# Patient Record
Sex: FEMALE | Race: WHITE | NOT HISPANIC OR LATINO | Employment: FULL TIME | URBAN - METROPOLITAN AREA
[De-identification: names, ages, dates, MRNs, and addresses within clinical notes are randomized per-mention and may not be internally consistent; named-entity substitution may affect disease eponyms.]

---

## 2020-09-25 ENCOUNTER — TRANSCRIBE ORDERS (OUTPATIENT)
Dept: LAB | Facility: CLINIC | Age: 45
End: 2020-09-25

## 2020-09-25 ENCOUNTER — APPOINTMENT (OUTPATIENT)
Dept: LAB | Facility: CLINIC | Age: 45
End: 2020-09-25

## 2020-09-25 DIAGNOSIS — Z02.1 PRE-EMPLOYMENT HEALTH SCREENING EXAMINATION: ICD-10-CM

## 2020-09-25 DIAGNOSIS — Z02.1 PRE-EMPLOYMENT HEALTH SCREENING EXAMINATION: Primary | ICD-10-CM

## 2020-09-25 PROCEDURE — 36415 COLL VENOUS BLD VENIPUNCTURE: CPT

## 2020-09-25 PROCEDURE — 86480 TB TEST CELL IMMUN MEASURE: CPT

## 2020-09-25 PROCEDURE — 86787 VARICELLA-ZOSTER ANTIBODY: CPT

## 2020-09-28 LAB
GAMMA INTERFERON BACKGROUND BLD IA-ACNC: 0.02 IU/ML
M TB IFN-G BLD-IMP: NEGATIVE
M TB IFN-G CD4+ BCKGRND COR BLD-ACNC: 0.01 IU/ML
M TB IFN-G CD4+ BCKGRND COR BLD-ACNC: 0.02 IU/ML
MITOGEN IGNF BCKGRD COR BLD-ACNC: >10 IU/ML

## 2020-09-29 LAB — VZV IGG SER IA-ACNC: NORMAL

## 2020-11-09 DIAGNOSIS — Z20.828 EXPOSURE TO SARS-ASSOCIATED CORONAVIRUS: Primary | ICD-10-CM

## 2020-11-09 PROCEDURE — U0003 INFECTIOUS AGENT DETECTION BY NUCLEIC ACID (DNA OR RNA); SEVERE ACUTE RESPIRATORY SYNDROME CORONAVIRUS 2 (SARS-COV-2) (CORONAVIRUS DISEASE [COVID-19]), AMPLIFIED PROBE TECHNIQUE, MAKING USE OF HIGH THROUGHPUT TECHNOLOGIES AS DESCRIBED BY CMS-2020-01-R: HCPCS | Performed by: PHYSICIAN ASSISTANT

## 2020-11-10 LAB — SARS-COV-2 RNA SPEC QL NAA+PROBE: NOT DETECTED

## 2021-01-14 ENCOUNTER — OFFICE VISIT (OUTPATIENT)
Dept: FAMILY MEDICINE CLINIC | Facility: CLINIC | Age: 46
End: 2021-01-14
Payer: COMMERCIAL

## 2021-01-14 VITALS
TEMPERATURE: 98.7 F | HEIGHT: 66 IN | DIASTOLIC BLOOD PRESSURE: 88 MMHG | SYSTOLIC BLOOD PRESSURE: 138 MMHG | RESPIRATION RATE: 17 BRPM | BODY MASS INDEX: 43.07 KG/M2 | WEIGHT: 268 LBS | OXYGEN SATURATION: 99 % | HEART RATE: 75 BPM

## 2021-01-14 DIAGNOSIS — Z12.31 BREAST CANCER SCREENING BY MAMMOGRAM: ICD-10-CM

## 2021-01-14 DIAGNOSIS — H73.893 TYMPANIC MEMBRANE RETRACTION, BILATERAL: ICD-10-CM

## 2021-01-14 DIAGNOSIS — Z13.29 THYROID DISORDER SCREEN: ICD-10-CM

## 2021-01-14 DIAGNOSIS — F41.9 ANXIETY AND DEPRESSION: ICD-10-CM

## 2021-01-14 DIAGNOSIS — F42.9 OBSESSIVE-COMPULSIVE DISORDER, UNSPECIFIED TYPE: ICD-10-CM

## 2021-01-14 DIAGNOSIS — Z76.89 ENCOUNTER TO ESTABLISH CARE WITH NEW DOCTOR: Primary | ICD-10-CM

## 2021-01-14 DIAGNOSIS — Z13.1 DIABETES MELLITUS SCREENING: ICD-10-CM

## 2021-01-14 DIAGNOSIS — H91.93 HEARING DIFFICULTY OF BOTH EARS: ICD-10-CM

## 2021-01-14 DIAGNOSIS — Z86.39 HISTORY OF VITAMIN D DEFICIENCY: ICD-10-CM

## 2021-01-14 DIAGNOSIS — N39.3 STRESS INCONTINENCE, FEMALE: ICD-10-CM

## 2021-01-14 DIAGNOSIS — J30.2 SEASONAL ALLERGIES: ICD-10-CM

## 2021-01-14 DIAGNOSIS — Z12.4 CERVICAL CANCER SCREENING: ICD-10-CM

## 2021-01-14 DIAGNOSIS — N92.1 MENORRHAGIA WITH IRREGULAR CYCLE: ICD-10-CM

## 2021-01-14 DIAGNOSIS — Z11.4 ENCOUNTER FOR SCREENING FOR HIV: ICD-10-CM

## 2021-01-14 DIAGNOSIS — E66.01 MORBID OBESITY (HCC): ICD-10-CM

## 2021-01-14 DIAGNOSIS — F32.A ANXIETY AND DEPRESSION: ICD-10-CM

## 2021-01-14 DIAGNOSIS — Z13.220 LIPID SCREENING: ICD-10-CM

## 2021-01-14 PROCEDURE — 99203 OFFICE O/P NEW LOW 30 MIN: CPT | Performed by: FAMILY MEDICINE

## 2021-01-14 RX ORDER — IBUPROFEN 200 MG
200 TABLET ORAL EVERY 6 HOURS PRN
COMMUNITY
End: 2022-04-29 | Stop reason: ALTCHOICE

## 2021-01-14 RX ORDER — BUPROPION HYDROCHLORIDE 300 MG/1
300 TABLET ORAL EVERY MORNING
COMMUNITY
Start: 2020-12-16 | End: 2021-02-05 | Stop reason: SDUPTHER

## 2021-01-14 RX ORDER — MELATONIN
1000 DAILY
COMMUNITY
End: 2021-02-22 | Stop reason: SDUPTHER

## 2021-01-14 NOTE — PROGRESS NOTES
Assessment/Plan:         Diagnoses and all orders for this visit:    Encounter to establish care with new doctor    Menorrhagia with irregular cycle  Comments:  GYN may likely recommend hysterectomy  Orders:  -     Ambulatory referral to Gynecology; Future  -     CBC and differential; Future    Stress incontinence, female  Comments:  had seen a urogyn about 2 yrs ago, wasdoing pelvic floor therapy- no f/u due to pandemic start, will wait to see GYN plan for her heavy menses- QUINTEN possibly     Hearing difficulty of both ears  Comments:  going on for about a year- worse since people wearing masks, hold nose and pop and it goes away I can hear for awhile, then comes back  Orders:  -     Ambulatory Referral to Otolaryngology; Future    Tympanic membrane retraction, bilateral  -     Ambulatory Referral to Otolaryngology; Future    Seasonal allergies    Morbid obesity ([facility] [address] )  -     Vitamin D 25 hydroxy; Future    History of vitamin D deficiency  -     Vitamin D 25 hydroxy; Future    Anxiety and depression  Comments:  stable on wellbutrin longstanding    Obsessive-compulsive disorder, unspecified type    Lipid screening  -     Lipid panel; Future    Breast cancer screening by mammogram  -     Mammo screening bilateral w 3d & cad; Future    Diabetes mellitus screening  -     Comprehensive metabolic panel; Future    Cervical cancer screening  -     Ambulatory referral to Gynecology; Future    Thyroid disorder screen  -     TSH, 3rd generation with Free T4 reflex; Future    Encounter for screening for HIV  -     HIV 1/2 Antigen/Antibody (4th Generation) w Reflex [facility]; Future              Subjective:   Chief Complaint   Patient presents with    Establish Care    Hearing Problem     Having trouble with hearing    Anxiety     Currently on Wellbutrin, anxiety has been increased lately    Menstrual Problem     Menstrual is worsening in the last 6 months   First two days are rough, passed gulf size blood clots         Patient ID: Judithe Lesches is a 50 y o  female  New pt- no old records in chart, including care-everywhere, pt states she tried to set it up but didn't work, switched jobs  has seen [address] [facility] family doctor for many years  Has been on wellbutrin for 3-4 yrs for depression, anxiety and OCD, seeing a psychiatrist and therapist, then had terrible insurance for a time, so her family doctor took over management  States she has a mass on her adrenal gland, found it when they found my kidney stones, family doctor was having me do MRI every year to every other year to monitor- was due for MRI when Isa jesus  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  Pap, mammo and the MRI were all due when COVID hit  New pt  Was due for labwork in September right when she was switching jobs, per pt  C/o "Need to go to an ob/gyn because my cycles are horrible last 6-7 months- first 2 days passing blood clots the size of golf balls and such pain," using OTC ibuprofen with only moderate benefit  States that 5-6 yrs ago had thickened endometrium biopsied- was normal  Also reports, "2002 when gave birth to daughter had placenta previa and also thyroid tumor was found"- also biopsied and ok per pt  States that her bp is only elevated with anxiety- checks at work on occasion and normal      The following portions of the patient's history were reviewed and updated as appropriate: allergies, current medications, past family history, past medical history, past social history, past surgical history and problem list     Review of Systems   All other systems reviewed and are negative  Objective:      /88 (BP Location: Left arm, Patient Position: Sitting)   Pulse 75   Temp 98 7 °F (37 1 °C)   Resp 17   Ht 5' 6" (1 676 m)   Wt 122 kg (268 lb)   LMP 01/11/2021   SpO2 99%   BMI 43 26 kg/m²           Physical Exam  Vitals signs and nursing note reviewed  Constitutional:       General: She is not in acute distress       Appearance: She is well-developed  She is not ill-appearing, toxic-appearing or diaphoretic  HENT:      Head: Normocephalic and atraumatic  Eyes:      General: Lids are normal       Conjunctiva/sclera: Conjunctivae normal       Pupils: Pupils are equal, round, and reactive to light  Neck:      Musculoskeletal: Neck supple  Thyroid: No thyroid mass or thyromegaly  Vascular: No JVD  Trachea: Trachea normal    Cardiovascular:      Rate and Rhythm: Normal rate and regular rhythm  Pulses: Normal pulses  Heart sounds: Normal heart sounds  Comments: No edema  Pulmonary:      Effort: Pulmonary effort is normal       Breath sounds: Normal breath sounds  Abdominal:      General: Bowel sounds are normal  There is no distension  Palpations: Abdomen is soft  There is no hepatomegaly, splenomegaly or mass  Tenderness: There is no abdominal tenderness  Lymphadenopathy:      Cervical: No cervical adenopathy  Upper Body:      Right upper body: No supraclavicular adenopathy  Left upper body: No supraclavicular adenopathy  Skin:     General: Skin is warm and dry  Coloration: Skin is not pale  Neurological:      Mental Status: She is alert and oriented to person, place, and time  Gait: Gait normal    Psychiatric:         Mood and Affect: Mood is anxious (mildly)  Behavior: Behavior normal  Behavior is cooperative  BMI Counseling: Body mass index is 43 26 kg/m²  The BMI is above normal  Nutrition recommendations include 3-5 servings of fruits/vegetables daily and decreasing soda and/or juice intake  Exercise recommendations include exercising 3-5 times per week

## 2021-01-18 ENCOUNTER — LAB (OUTPATIENT)
Dept: LAB | Facility: MEDICAL CENTER | Age: 46
End: 2021-01-18
Payer: COMMERCIAL

## 2021-01-18 DIAGNOSIS — Z13.220 LIPID SCREENING: ICD-10-CM

## 2021-01-18 DIAGNOSIS — Z13.1 DIABETES MELLITUS SCREENING: ICD-10-CM

## 2021-01-18 DIAGNOSIS — Z86.39 HISTORY OF VITAMIN D DEFICIENCY: ICD-10-CM

## 2021-01-18 DIAGNOSIS — E66.01 MORBID OBESITY (HCC): ICD-10-CM

## 2021-01-18 DIAGNOSIS — N92.1 MENORRHAGIA WITH IRREGULAR CYCLE: ICD-10-CM

## 2021-01-18 DIAGNOSIS — Z11.4 ENCOUNTER FOR SCREENING FOR HIV: ICD-10-CM

## 2021-01-18 DIAGNOSIS — Z13.29 THYROID DISORDER SCREEN: ICD-10-CM

## 2021-01-18 LAB
25(OH)D3 SERPL-MCNC: 14.8 NG/ML (ref 30–100)
ALBUMIN SERPL BCP-MCNC: 3.5 G/DL (ref 3.5–5)
ALP SERPL-CCNC: 85 U/L (ref 46–116)
ALT SERPL W P-5'-P-CCNC: 19 U/L (ref 12–78)
ANION GAP SERPL CALCULATED.3IONS-SCNC: 4 MMOL/L (ref 4–13)
AST SERPL W P-5'-P-CCNC: 13 U/L (ref 5–45)
BASOPHILS # BLD AUTO: 0.03 THOUSANDS/ΜL (ref 0–0.1)
BASOPHILS NFR BLD AUTO: 0 % (ref 0–1)
BILIRUB SERPL-MCNC: 0.26 MG/DL (ref 0.2–1)
BUN SERPL-MCNC: 12 MG/DL (ref 5–25)
CALCIUM SERPL-MCNC: 9.3 MG/DL (ref 8.3–10.1)
CHLORIDE SERPL-SCNC: 110 MMOL/L (ref 100–108)
CHOLEST SERPL-MCNC: 140 MG/DL (ref 50–200)
CO2 SERPL-SCNC: 26 MMOL/L (ref 21–32)
CREAT SERPL-MCNC: 0.8 MG/DL (ref 0.6–1.3)
EOSINOPHIL # BLD AUTO: 0.2 THOUSAND/ΜL (ref 0–0.61)
EOSINOPHIL NFR BLD AUTO: 3 % (ref 0–6)
ERYTHROCYTE [DISTWIDTH] IN BLOOD BY AUTOMATED COUNT: 12 % (ref 11.6–15.1)
GFR SERPL CREATININE-BSD FRML MDRD: 88 ML/MIN/1.73SQ M
GLUCOSE P FAST SERPL-MCNC: 84 MG/DL (ref 65–99)
HCT VFR BLD AUTO: 44.7 % (ref 34.8–46.1)
HDLC SERPL-MCNC: 46 MG/DL
HGB BLD-MCNC: 14.4 G/DL (ref 11.5–15.4)
IMM GRANULOCYTES # BLD AUTO: 0.03 THOUSAND/UL (ref 0–0.2)
IMM GRANULOCYTES NFR BLD AUTO: 0 % (ref 0–2)
LDLC SERPL CALC-MCNC: 79 MG/DL (ref 0–100)
LYMPHOCYTES # BLD AUTO: 1.47 THOUSANDS/ΜL (ref 0.6–4.47)
LYMPHOCYTES NFR BLD AUTO: 22 % (ref 14–44)
MCH RBC QN AUTO: 31.2 PG (ref 26.8–34.3)
MCHC RBC AUTO-ENTMCNC: 32.2 G/DL (ref 31.4–37.4)
MCV RBC AUTO: 97 FL (ref 82–98)
MONOCYTES # BLD AUTO: 0.56 THOUSAND/ΜL (ref 0.17–1.22)
MONOCYTES NFR BLD AUTO: 8 % (ref 4–12)
NEUTROPHILS # BLD AUTO: 4.47 THOUSANDS/ΜL (ref 1.85–7.62)
NEUTS SEG NFR BLD AUTO: 67 % (ref 43–75)
NONHDLC SERPL-MCNC: 94 MG/DL
NRBC BLD AUTO-RTO: 0 /100 WBCS
PLATELET # BLD AUTO: 299 THOUSANDS/UL (ref 149–390)
PMV BLD AUTO: 10.2 FL (ref 8.9–12.7)
POTASSIUM SERPL-SCNC: 4.3 MMOL/L (ref 3.5–5.3)
PROT SERPL-MCNC: 7.4 G/DL (ref 6.4–8.2)
RBC # BLD AUTO: 4.61 MILLION/UL (ref 3.81–5.12)
SODIUM SERPL-SCNC: 140 MMOL/L (ref 136–145)
TRIGL SERPL-MCNC: 75 MG/DL
TSH SERPL DL<=0.05 MIU/L-ACNC: 1.61 UIU/ML (ref 0.36–3.74)
WBC # BLD AUTO: 6.76 THOUSAND/UL (ref 4.31–10.16)

## 2021-01-18 PROCEDURE — 36415 COLL VENOUS BLD VENIPUNCTURE: CPT

## 2021-01-18 PROCEDURE — 84443 ASSAY THYROID STIM HORMONE: CPT

## 2021-01-18 PROCEDURE — 80061 LIPID PANEL: CPT

## 2021-01-18 PROCEDURE — 87389 HIV-1 AG W/HIV-1&-2 AB AG IA: CPT

## 2021-01-18 PROCEDURE — 82306 VITAMIN D 25 HYDROXY: CPT

## 2021-01-18 PROCEDURE — 85025 COMPLETE CBC W/AUTO DIFF WBC: CPT

## 2021-01-18 PROCEDURE — 80053 COMPREHEN METABOLIC PANEL: CPT

## 2021-01-19 ENCOUNTER — ANNUAL EXAM (OUTPATIENT)
Dept: OBGYN CLINIC | Facility: MEDICAL CENTER | Age: 46
End: 2021-01-19
Payer: COMMERCIAL

## 2021-01-19 VITALS
DIASTOLIC BLOOD PRESSURE: 86 MMHG | SYSTOLIC BLOOD PRESSURE: 125 MMHG | HEIGHT: 66 IN | WEIGHT: 270 LBS | BODY MASS INDEX: 43.39 KG/M2

## 2021-01-19 DIAGNOSIS — Z01.411 ENCOUNTER FOR GYNECOLOGICAL EXAMINATION WITH ABNORMAL FINDING: Primary | ICD-10-CM

## 2021-01-19 DIAGNOSIS — Z12.4 ENCOUNTER FOR PAPANICOLAOU SMEAR FOR CERVICAL CANCER SCREENING: ICD-10-CM

## 2021-01-19 DIAGNOSIS — G89.18 OTHER ACUTE POSTPROCEDURAL PAIN: ICD-10-CM

## 2021-01-19 DIAGNOSIS — N93.9 ABNORMAL UTERINE BLEEDING: ICD-10-CM

## 2021-01-19 LAB — HIV 1+2 AB+HIV1 P24 AG SERPL QL IA: NORMAL

## 2021-01-19 PROCEDURE — 88305 TISSUE EXAM BY PATHOLOGIST: CPT | Performed by: PATHOLOGY

## 2021-01-19 PROCEDURE — 87624 HPV HI-RISK TYP POOLED RSLT: CPT | Performed by: STUDENT IN AN ORGANIZED HEALTH CARE EDUCATION/TRAINING PROGRAM

## 2021-01-19 PROCEDURE — G0145 SCR C/V CYTO,THINLAYER,RESCR: HCPCS | Performed by: STUDENT IN AN ORGANIZED HEALTH CARE EDUCATION/TRAINING PROGRAM

## 2021-01-19 PROCEDURE — 99386 PREV VISIT NEW AGE 40-64: CPT | Performed by: STUDENT IN AN ORGANIZED HEALTH CARE EDUCATION/TRAINING PROGRAM

## 2021-01-19 PROCEDURE — 58100 BIOPSY OF UTERUS LINING: CPT | Performed by: STUDENT IN AN ORGANIZED HEALTH CARE EDUCATION/TRAINING PROGRAM

## 2021-01-19 RX ORDER — TITANIUM DIOXIDE, OCTINOXATE, ZINC OXIDE 4.61; 1.6; .78 G/40ML; G/40ML; G/40ML
CREAM TOPICAL
COMMUNITY
End: 2021-11-05

## 2021-01-19 RX ORDER — IBUPROFEN 600 MG/1
600 TABLET ORAL ONCE
Status: COMPLETED | OUTPATIENT
Start: 2021-01-19 | End: 2021-01-19

## 2021-01-19 RX ADMIN — IBUPROFEN 600 MG: 600 TABLET ORAL at 15:44

## 2021-01-19 NOTE — PROGRESS NOTES
[facility] [address] [place] [address]    Endometrial biopsy    Date/Time: 1/19/2021 2:01 PM  Performed by: Rashid Gibson MD  Authorized by: Rashid Gibson MD   Universal Protocol:  Consent: Written consent obtained  Risks and benefits: risks, benefits and alternatives were discussed  Consent given by: patient  Timeout called at: 1/19/2021 1:15 PM   Patient understanding: patient states understanding of the procedure being performed  Patient consent: the patient's understanding of the procedure matches consent given  Procedure consent: procedure consent matches procedure scheduled  Relevant documents: relevant documents present and verified  Patient identity confirmed: verbally with patient      Indication:     Indications:  Other disorder of menstruation and other abnormal bleeding from female genital tract    Procedure:     Procedure: endometrial biopsy with Pipelle      A bivalve speculum was placed in the vagina: yes      Cervix cleaned and prepped: yes      The cervix was dilated: yes      Uterus sounded: yes      Uterus sound depth (cm):  9    Specimen collected: specimen collected and sent to pathology      Patient tolerated procedure well with no complications: yes    Findings:     Uterus size:  9-10 weeks    Cervix: normal      Adnexa: normal          Emmanuel Calderon MD  [address] [place] [address]  1/19/2021 2:02 PM

## 2021-01-19 NOTE — ASSESSMENT & PLAN NOTE
- We discussed the possible etiologies of abnormal uterine bleeding including but not limited to perimenopausal hormonal changes, endometrial polyps, endometrial hyperplasia, and malignancy  - We reviewed her Pelvic ultrasound from [facility] in 2019 which showed 10 cm x 5 cm x 5 cm uterus with 12 mm lining    - We discussed the options for endometrial sampling  She is amenable to office EMB today  An EMB was performed in the office today  - We discussed management options for AUB including oral norethindrone, Lysteda, Mirena IUD, hysteroscopy D&C, endometrial ablation, or hysterectomy  - She desires to start oral progestin  Rx norethindrone 5 mg daily

## 2021-01-19 NOTE — PROGRESS NOTES
[facility] [address] [place] [address]    ASSESSMENT/PLAN: Elsa Lane is a 50 y o  [id number] who presents for annual gynecologic exam     Encounter for routine gynecologic examination  - Routine well woman exam completed today  - Cervical Cancer Screening: Current ASCCP Guidelines reviewed  Last Pap: 2017 [facility]  Pap done today with co-testing   - HPV Vaccination status: Not immunized  - STI screening offered including HIV: Declined  - Contraceptive counseling discussed  Current contraception: condoms:   - Breast Cancer Screening: Last Mammogram at least 2 years ago  Has requisition from PCP for Mammography  Additional problems addressed at this visit:  1  Encounter for Papanicolaou smear for cervical cancer screening  -     Liquid-based pap, screening    2  Abnormal uterine bleeding  Assessment & Plan:  - We discussed the possible etiologies of abnormal uterine bleeding including but not limited to perimenopausal hormonal changes, endometrial polyps, endometrial hyperplasia, and malignancy  - We reviewed her Pelvic ultrasound from [facility] in 2019 which showed 10 cm x 5 cm x 5 cm uterus with 12 mm lining    - We discussed the options for endometrial sampling  She is amenable to office EMB today  An EMB was performed in the office today  - We discussed management options for AUB including oral norethindrone, Lysteda, Mirena IUD, hysteroscopy D&C, endometrial ablation, or hysterectomy  - She desires to start oral progestin  Rx norethindrone 5 mg daily  Orders:  -     Tissue Exam  -     norethindrone (AYGESTIN) 5 mg tablet; Take 1 tablet (5 mg total) by mouth daily    3  Pelvic pain        CC:  Annual Gynecologic Examination    HPI: Elsa Lane is a 50 y o  [id number] who presents for annual gynecologic examination  She is new to the [address] [place] [address]  She recently started a job as a  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  She reports no new changes to her health    She reports no breast concerns  She gets regular periods  She has no vaginal discharge, vulvar or vaginal lesions, pelvic pain  She reports very heavy menstrual bleeding, soaking through her pads and clothes, changing them every 1-2 hours during the heaviest days of her cycle  It has gotten particularly bad over the past 3-6 months  She had a normal EMB in 2014 through [facility]  She has no sexual health concerns and is currently sexually active with one male partner  She contracepts with condoms  She has no symptoms of pelvic organ prolapse, urinary, or fecal incontinence  She denies intimate partner violence  The following portions of the patient's history were reviewed and updated as appropriate: allergies, current medications, past family history, past medical history, obstetric history, gynecologic history, past social history, past surgical history and problem list     Review of Systems   Constitutional: Negative for chills and fever  Respiratory: Negative for cough and shortness of breath  Cardiovascular: Negative for chest pain and leg swelling  Gastrointestinal: Negative for abdominal pain, nausea and vomiting  Genitourinary: Negative for dysuria, frequency and urgency  Neurological: Negative for dizziness, light-headedness and headaches  Objective:  /86   Ht 5' 6" (1 676 m)   Wt 122 kg (270 lb)   LMP 01/11/2021   BMI 43 58 kg/m²    Physical Exam  Exam conducted with a chaperone present  Constitutional:       Appearance: Normal appearance  She is obese  HENT:      Head: Normocephalic and atraumatic  Cardiovascular:      Rate and Rhythm: Normal rate  Pulmonary:      Effort: Pulmonary effort is normal    Chest:      Breasts: Breasts are symmetrical          Right: Normal  No swelling, bleeding, inverted nipple, mass, nipple discharge, skin change or tenderness  Left: Normal  No swelling, bleeding, inverted nipple, mass, nipple discharge, skin change or tenderness  Abdominal:      General: There is no distension  Tenderness: There is no abdominal tenderness  There is no guarding  Genitourinary:     Exam position: Lithotomy position  Pubic Area: No rash  Labia:         Right: No rash, tenderness or lesion  Left: No rash, tenderness or lesion  Urethra: No prolapse, urethral swelling or urethral lesion  Vagina: Normal  No vaginal discharge, erythema, tenderness, bleeding or lesions  Cervix: No cervical motion tenderness, discharge, friability or erythema  Uterus: Not enlarged, not tender and no uterine prolapse  Adnexa:         Right: No mass, tenderness or fullness  Left: No mass, tenderness or fullness  Comments: Parous cervix  Enlarged mobile uterus  Copious thick tissue retrieved on endometrial biopsy  Lymphadenopathy:      Upper Body:      Right upper body: No axillary adenopathy  Left upper body: No axillary adenopathy  Lower Body: No right inguinal adenopathy  No left inguinal adenopathy  Neurological:      Mental Status: She is alert               Jackelin Maurer MD  [facility]  01/19/21 2:00 PM

## 2021-01-23 LAB
HPV HR 12 DNA CVX QL NAA+PROBE: NEGATIVE
HPV16 DNA CVX QL NAA+PROBE: NEGATIVE
HPV18 DNA CVX QL NAA+PROBE: NEGATIVE

## 2021-01-24 LAB
LAB AP GYN PRIMARY INTERPRETATION: NORMAL
Lab: NORMAL

## 2021-02-05 ENCOUNTER — TELEMEDICINE (OUTPATIENT)
Dept: FAMILY MEDICINE CLINIC | Facility: CLINIC | Age: 46
End: 2021-02-05
Payer: COMMERCIAL

## 2021-02-05 DIAGNOSIS — F41.9 ANXIETY AND DEPRESSION: ICD-10-CM

## 2021-02-05 DIAGNOSIS — F32.A ANXIETY AND DEPRESSION: ICD-10-CM

## 2021-02-05 DIAGNOSIS — E55.9 VITAMIN D DEFICIENCY: Primary | ICD-10-CM

## 2021-02-05 PROCEDURE — 99213 OFFICE O/P EST LOW 20 MIN: CPT | Performed by: FAMILY MEDICINE

## 2021-02-05 RX ORDER — BUPROPION HYDROCHLORIDE 300 MG/1
300 TABLET ORAL EVERY MORNING
Qty: 30 TABLET | Refills: 0 | Status: SHIPPED | OUTPATIENT
Start: 2021-02-05 | End: 2021-02-22 | Stop reason: SDUPTHER

## 2021-02-05 RX ORDER — ERGOCALCIFEROL 1.25 MG/1
50000 CAPSULE ORAL WEEKLY
Qty: 4 CAPSULE | Refills: 4 | Status: SHIPPED | OUTPATIENT
Start: 2021-02-05 | End: 2021-02-22 | Stop reason: SDUPTHER

## 2021-02-05 NOTE — PROGRESS NOTES
Virtual Regular Visit      Assessment/Plan:    Problem List Items Addressed This Visit        Other    Anxiety and depression    Relevant Medications    buPROPion (WELLBUTRIN XL) 300 mg 24 hr tablet      Other Visit Diagnoses     Vitamin D deficiency    -  Primary    Relevant Medications    ergocalciferol (VITAMIN D2) 50,000 units    Other Relevant Orders    Vitamin D 25 hydroxy               Reason for visit is review lab results  Chief Complaint   Patient presents with    Virtual Regular Visit        Encounter provider Griselda Coronado DO    Provider located at [address] [place] [address],   [facility], [address] [place] [address],[address] [id number]      Recent Visits  Date Type Provider Dept   02/05/21 Telemedicine Griselda Coronado, [address] [place], [place] [address] recent visits within past 7 days and meeting all other requirements     Future Appointments  No visits were found meeting these conditions  Showing future appointments within next 150 days and meeting all other requirements        The patient was identified by name and date of birth  Angel Luis Vargas was informed that this is a telemedicine visit and that the visit is being conducted through [facility] and patient was informed that this is a secure, HIPAA-compliant platform  She agrees to proceed     My office door was closed  No one else was in the room  She acknowledged consent and understanding of privacy and security of the video platform  The patient has agreed to participate and understands they can discontinue the visit at any time  Patient is aware this is a billable service  Subjective  Angel Luis Vargas is a 50 y o  female      Doing well, no interval acute problems  Labs ok except for vit D deficiency    HPI     Past Medical History:   Diagnosis Date    Abnormal Pap smear of cervix        Past Surgical History:   Procedure Laterality Date    CHOLECYSTECTOMY      KIDNEY STONE SURGERY      MOUTH BIOPSY      TUBAL LIGATION         Current Outpatient Medications   Medication Sig Dispense Refill    buPROPion (WELLBUTRIN XL) 300 mg 24 hr tablet Take 1 tablet (300 mg total) by mouth every morning 30 tablet 0    cholecalciferol (VITAMIN D3) 1,000 units tablet Take 1,000 Units by mouth daily      Cranberry 400 MG CAPS Take by mouth      ibuprofen (Advil) 200 mg tablet Take 200 mg by mouth every 6 (six) hours as needed for mild pain      norethindrone (AYGESTIN) 5 mg tablet Take 1 tablet (5 mg total) by mouth daily 90 tablet 3    ergocalciferol (VITAMIN D2) 50,000 units Take 1 capsule (50,000 Units total) by mouth once a week 4 capsule 4     No current facility-administered medications for this visit  Allergies   Allergen Reactions    Amoxicillin Hives    Azithromycin Vomiting and Hives    Penicillins Hives       Review of Systems    Video Exam    There were no vitals filed for this visit  Physical Exam  Constitutional:       General: She is not in acute distress  Appearance: She is well-developed  She is not ill-appearing, toxic-appearing or diaphoretic  HENT:      Head: Normocephalic and atraumatic  Mouth/Throat:      Pharynx: Uvula midline  Neck:      Trachea: Phonation normal    Pulmonary:      Effort: Pulmonary effort is normal    Skin:     Coloration: Skin is not pale  Neurological:      Mental Status: She is alert and oriented to person, place, and time  Psychiatric:         Behavior: Behavior is cooperative  I spent 13 minutes directly with the patient during this visit      VIRTUAL VISIT DISCLAIMER    Emilee Suazo acknowledges that she has consented to an online visit or consultation  She understands that the online visit is based solely on information provided by her, and that, in the absence of a face-to-face physical evaluation by the physician, the diagnosis she receives is both limited and provisional in terms of accuracy and completeness   This is not intended to replace a full medical face-to-face evaluation by the physician  Alex Mendes understands and accepts these terms

## 2021-03-20 ENCOUNTER — HOSPITAL ENCOUNTER (OUTPATIENT)
Dept: CT IMAGING | Facility: HOSPITAL | Age: 46
Discharge: HOME | End: 2021-03-20
Attending: OTOLARYNGOLOGY
Payer: COMMERCIAL

## 2021-03-20 DIAGNOSIS — H90.A31 MIXED CONDUCTIVE AND SENSORINEURAL HEARING LOSS OF RIGHT EAR WITH RESTRICTED HEARING OF LEFT EAR: ICD-10-CM

## 2021-03-20 PROCEDURE — G1004 CDSM NDSC: HCPCS

## 2021-03-20 PROCEDURE — 70480 CT ORBIT/EAR/FOSSA W/O DYE: CPT

## 2021-04-29 ENCOUNTER — IMMUNIZATIONS (OUTPATIENT)
Dept: FAMILY MEDICINE CLINIC | Facility: HOSPITAL | Age: 46
End: 2021-04-29

## 2021-04-29 DIAGNOSIS — Z23 ENCOUNTER FOR IMMUNIZATION: Primary | ICD-10-CM

## 2021-04-29 PROCEDURE — 91301 SARS-COV-2 / COVID-19 MRNA VACCINE (MODERNA) 100 MCG: CPT

## 2021-04-29 PROCEDURE — 0011A SARS-COV-2 / COVID-19 MRNA VACCINE (MODERNA) 100 MCG: CPT

## 2021-05-10 ENCOUNTER — HOSPITAL ENCOUNTER (OUTPATIENT)
Dept: MAMMOGRAPHY | Facility: MEDICAL CENTER | Age: 46
Discharge: HOME | End: 2021-05-10
Payer: COMMERCIAL

## 2021-05-10 VITALS — HEIGHT: 66 IN | BODY MASS INDEX: 43.39 KG/M2 | WEIGHT: 270 LBS

## 2021-05-10 DIAGNOSIS — Z12.31 BREAST CANCER SCREENING BY MAMMOGRAM: ICD-10-CM

## 2021-05-10 PROCEDURE — 77063 BREAST TOMOSYNTHESIS BI: CPT

## 2021-05-10 PROCEDURE — 77067 SCR MAMMO BI INCL CAD: CPT

## 2021-05-27 ENCOUNTER — IMMUNIZATIONS (OUTPATIENT)
Dept: FAMILY MEDICINE CLINIC | Facility: HOSPITAL | Age: 46
End: 2021-05-27

## 2021-05-27 DIAGNOSIS — Z23 ENCOUNTER FOR IMMUNIZATION: Primary | ICD-10-CM

## 2021-05-27 PROCEDURE — 0012A SARS-COV-2 / COVID-19 MRNA VACCINE (MODERNA) 100 MCG: CPT

## 2021-05-27 PROCEDURE — 91301 SARS-COV-2 / COVID-19 MRNA VACCINE (MODERNA) 100 MCG: CPT

## 2021-06-14 DIAGNOSIS — N93.9 ABNORMAL UTERINE BLEEDING: ICD-10-CM

## 2021-09-09 ENCOUNTER — APPOINTMENT (OUTPATIENT)
Dept: LAB | Facility: MEDICAL CENTER | Age: 46
End: 2021-09-09

## 2021-09-09 DIAGNOSIS — Z00.8 ENCOUNTER FOR OTHER GENERAL EXAMINATION: ICD-10-CM

## 2021-09-09 LAB
CHOLEST SERPL-MCNC: 129 MG/DL (ref 50–200)
EST. AVERAGE GLUCOSE BLD GHB EST-MCNC: 105 MG/DL
HBA1C MFR BLD: 5.3 %
HDLC SERPL-MCNC: 31 MG/DL
LDLC SERPL CALC-MCNC: 83 MG/DL (ref 0–100)
NONHDLC SERPL-MCNC: 98 MG/DL
TRIGL SERPL-MCNC: 76 MG/DL

## 2021-09-09 PROCEDURE — 80061 LIPID PANEL: CPT

## 2021-09-09 PROCEDURE — 36415 COLL VENOUS BLD VENIPUNCTURE: CPT

## 2021-09-09 PROCEDURE — 83036 HEMOGLOBIN GLYCOSYLATED A1C: CPT

## 2021-09-30 DIAGNOSIS — E55.9 VITAMIN D DEFICIENCY: ICD-10-CM

## 2021-09-30 DIAGNOSIS — F32.A ANXIETY AND DEPRESSION: ICD-10-CM

## 2021-09-30 DIAGNOSIS — F41.9 ANXIETY AND DEPRESSION: ICD-10-CM

## 2021-10-01 RX ORDER — MELATONIN
1000 DAILY
Qty: 90 TABLET | Refills: 1 | Status: SHIPPED | OUTPATIENT
Start: 2021-10-01 | End: 2022-04-15 | Stop reason: SDUPTHER

## 2021-10-01 RX ORDER — BUPROPION HYDROCHLORIDE 300 MG/1
300 TABLET ORAL EVERY MORNING
Qty: 90 TABLET | Refills: 1 | Status: SHIPPED | OUTPATIENT
Start: 2021-10-01 | End: 2022-04-15 | Stop reason: SDUPTHER

## 2021-11-05 ENCOUNTER — OFFICE VISIT (OUTPATIENT)
Dept: FAMILY MEDICINE CLINIC | Facility: CLINIC | Age: 46
End: 2021-11-05
Payer: COMMERCIAL

## 2021-11-05 VITALS
OXYGEN SATURATION: 99 % | TEMPERATURE: 97.6 F | WEIGHT: 270 LBS | BODY MASS INDEX: 43.39 KG/M2 | HEIGHT: 66 IN | HEART RATE: 82 BPM | SYSTOLIC BLOOD PRESSURE: 132 MMHG | DIASTOLIC BLOOD PRESSURE: 100 MMHG

## 2021-11-05 DIAGNOSIS — E78.6 LOW HDL (UNDER 40): ICD-10-CM

## 2021-11-05 DIAGNOSIS — F32.A ANXIETY AND DEPRESSION: Primary | ICD-10-CM

## 2021-11-05 DIAGNOSIS — F41.9 ANXIETY AND DEPRESSION: Primary | ICD-10-CM

## 2021-11-05 DIAGNOSIS — Z13.1 ENCOUNTER FOR SCREENING EXAMINATION FOR IMPAIRED GLUCOSE REGULATION AND DIABETES MELLITUS: ICD-10-CM

## 2021-11-05 DIAGNOSIS — E55.9 VITAMIN D DEFICIENCY: ICD-10-CM

## 2021-11-05 DIAGNOSIS — Z13.220 LIPID SCREENING: ICD-10-CM

## 2021-11-05 DIAGNOSIS — R03.0 ELEVATED BLOOD PRESSURE READING IN OFFICE WITHOUT DIAGNOSIS OF HYPERTENSION: ICD-10-CM

## 2021-11-05 PROCEDURE — 99214 OFFICE O/P EST MOD 30 MIN: CPT | Performed by: FAMILY MEDICINE

## 2021-11-17 ENCOUNTER — TELEPHONE (OUTPATIENT)
Dept: PSYCHIATRY | Facility: CLINIC | Age: 46
End: 2021-11-17

## 2021-11-19 ENCOUNTER — TELEPHONE (OUTPATIENT)
Dept: PSYCHIATRY | Facility: CLINIC | Age: 46
End: 2021-11-19

## 2021-11-19 ENCOUNTER — TELEPHONE (OUTPATIENT)
Dept: FAMILY MEDICINE CLINIC | Facility: CLINIC | Age: 46
End: 2021-11-19

## 2021-11-19 DIAGNOSIS — F32.A ANXIETY AND DEPRESSION: Primary | ICD-10-CM

## 2021-11-19 DIAGNOSIS — F41.9 ANXIETY AND DEPRESSION: Primary | ICD-10-CM

## 2021-11-23 ENCOUNTER — TELEPHONE (OUTPATIENT)
Dept: PSYCHIATRY | Facility: CLINIC | Age: 46
End: 2021-11-23

## 2021-12-01 ENCOUNTER — TELEPHONE (OUTPATIENT)
Dept: PSYCHIATRY | Facility: CLINIC | Age: 46
End: 2021-12-01

## 2021-12-06 ENCOUNTER — OFFICE VISIT (OUTPATIENT)
Dept: OBGYN CLINIC | Facility: MEDICAL CENTER | Age: 46
End: 2021-12-06
Payer: COMMERCIAL

## 2021-12-06 VITALS
HEIGHT: 66 IN | WEIGHT: 272 LBS | DIASTOLIC BLOOD PRESSURE: 98 MMHG | BODY MASS INDEX: 43.71 KG/M2 | SYSTOLIC BLOOD PRESSURE: 138 MMHG

## 2021-12-06 DIAGNOSIS — N93.9 ABNORMAL UTERINE BLEEDING: Primary | ICD-10-CM

## 2021-12-06 PROCEDURE — 99214 OFFICE O/P EST MOD 30 MIN: CPT | Performed by: STUDENT IN AN ORGANIZED HEALTH CARE EDUCATION/TRAINING PROGRAM

## 2021-12-17 ENCOUNTER — HOSPITAL ENCOUNTER (OUTPATIENT)
Dept: ULTRASOUND IMAGING | Facility: MEDICAL CENTER | Age: 46
Discharge: HOME | End: 2021-12-17
Payer: COMMERCIAL

## 2021-12-17 DIAGNOSIS — N93.9 ABNORMAL UTERINE BLEEDING: ICD-10-CM

## 2021-12-17 PROCEDURE — 76830 TRANSVAGINAL US NON-OB: CPT

## 2021-12-17 PROCEDURE — 76856 US EXAM PELVIC COMPLETE: CPT

## 2022-01-12 ENCOUNTER — CLINICAL SUPPORT (OUTPATIENT)
Dept: FAMILY MEDICINE CLINIC | Facility: CLINIC | Age: 47
End: 2022-01-12
Payer: COMMERCIAL

## 2022-01-12 DIAGNOSIS — R09.89 CHEST CONGESTION: ICD-10-CM

## 2022-01-12 DIAGNOSIS — R51.9 NONINTRACTABLE HEADACHE, UNSPECIFIED CHRONICITY PATTERN, UNSPECIFIED HEADACHE TYPE: Primary | ICD-10-CM

## 2022-01-12 LAB
SARS-COV-2 AG UPPER RESP QL IA: POSITIVE
VALID CONTROL: ABNORMAL

## 2022-01-12 PROCEDURE — 87811 SARS-COV-2 COVID19 W/OPTIC: CPT

## 2022-01-12 NOTE — Clinical Note
Pt started with headaches yesterday , chest congestion today   Came in for a rapid Covid test   Pts test is positive   She will follow up with pcp

## 2022-01-14 ENCOUNTER — TELEMEDICINE (OUTPATIENT)
Dept: FAMILY MEDICINE CLINIC | Facility: CLINIC | Age: 47
End: 2022-01-14
Payer: COMMERCIAL

## 2022-01-14 DIAGNOSIS — U07.1 COVID-19 VIRUS INFECTION: Primary | ICD-10-CM

## 2022-01-14 PROCEDURE — 99213 OFFICE O/P EST LOW 20 MIN: CPT | Performed by: FAMILY MEDICINE

## 2022-01-14 NOTE — PROGRESS NOTES
COVID-19 Outpatient Progress Note    Assessment/Plan:    Problem List Items Addressed This Visit     None      Visit Diagnoses     COVID-19 virus infection    -  Primary         Disposition:     Patient has COVID-19 infection  Based off CDC guidelines, they were recommended to isolate for 5 days from the date of the positive test  If they remain asymptomatic, isolation may be ended followed by 5 days of wearing a mask when around othes to minimize risk of infecting others  If they have a fever, continue to stay home until fever resolves for at least 24 hours  Estimated RTW date 01/17 with continued mask-wearing for 5 days as above    I have spent 15 minutes directly with the patient  Encounter provider Anna Medrano DO    Provider located at [address] [place] [address],   [facility] [address]    Recent Visits  Date Type Provider Dept   01/14/22 Telemedicine Anna Medrano  [facility] recent visits within past 7 days and meeting all other requirements  Future Appointments  No visits were found meeting these conditions  Showing future appointments within next 150 days and meeting all other requirements     This virtual check-in was done via [employer] and patient was informed that this is a secure, HIPAA-compliant platform  She agrees to proceed  Patient agrees to participate in a virtual check in via telephone or video visit instead of presenting to the office to address urgent/immediate medical needs  Patient is aware this is a billable service  After connecting through [facility], the patient was identified by name and date of birth  Manuelito Flores was informed that this was a telemedicine visit and that the exam was being conducted confidentially over secure lines  My office door was closed  No one else was in the room   Manuelito Flores acknowledged consent and understanding of privacy and security of the telemedicine visit  I informed the patient that I have reviewed her record in Epic and presented the opportunity for her to ask any questions regarding the visit today  The patient agreed to participate  Verification of patient location:  Patient is located in the following state in which I hold an active license: [place]    Subjective: Ninfa Meyers is a 48 y o  female who has been screened for COVID-19  Symptom change since last report: improving      - Date of symptom onset: 1/10/2022  - Date of positive COVID-19 test: 1/12/2022  Type of test: Rapid antigen  COVID-19 vaccination status: Fully vaccinated (primary series)    Clint Borrero has been staying home and has isolated themselves in her home  She is taking care to not share personal items and is cleaning all surfaces that are touched often, like counters, tabletops, and doorknobs using household cleaning sprays or wipes  She is wearing a mask when she leaves her room  "Not too bad, still congested a lot, but not bad at all"  "Had fever on Tuesday, but since then no"  Works for [address] [employer]    Lab Results   Component Value Date    [facility] [place] Not Detected 11/09/2020    [address] Positive (A) 01/12/2022     Past Medical History:   Diagnosis Date    Abnormal Pap smear of cervix      Past Surgical History:   Procedure Laterality Date    CHOLECYSTECTOMY      KIDNEY STONE SURGERY      MOUTH BIOPSY      TUBAL LIGATION       Current Outpatient Medications   Medication Sig Dispense Refill    cholecalciferol (VITAMIN D3) 1,000 units tablet Take 1 tablet (1,000 Units total) by mouth daily 90 tablet 1    buPROPion (WELLBUTRIN XL) 300 mg 24 hr tablet Take 1 tablet (300 mg total) by mouth every morning 90 tablet 1    ibuprofen (Advil) 200 mg tablet Take 200 mg by mouth every 6 (six) hours as needed for mild pain       No current facility-administered medications for this visit       Allergies   Allergen Reactions    Amoxicillin Hives    Azithromycin Vomiting and Hives  Penicillins Hives       Review of Systems  Objective: There were no vitals filed for this visit  Physical Exam  Constitutional:       General: She is not in acute distress  Appearance: She is not ill-appearing, toxic-appearing or diaphoretic  HENT:      Head: Normocephalic and atraumatic  Nose: Nose normal       Mouth/Throat:      Mouth: Mucous membranes are moist    Eyes:      Conjunctiva/sclera: Conjunctivae normal    Pulmonary:      Effort: Pulmonary effort is normal    Skin:     Coloration: Skin is not pale  Neurological:      Mental Status: She is alert and oriented to person, place, and time  Psychiatric:         Mood and Affect: Mood normal          VIRTUAL VISIT DISCLAIMER    Wm Alberts verbally agrees to participate in [employer]  Pt is aware that [employer] could be limited without vital signs or the ability to perform a full hands-on physical [employer] understands she or the provider may request at any time to terminate the video visit and request the patient to seek care or treatment in person

## 2022-01-22 ENCOUNTER — APPOINTMENT (OUTPATIENT)
Dept: LAB | Facility: HOSPITAL | Age: 47
End: 2022-01-22
Attending: STUDENT IN AN ORGANIZED HEALTH CARE EDUCATION/TRAINING PROGRAM
Payer: COMMERCIAL

## 2022-01-22 DIAGNOSIS — N93.9 ABNORMAL UTERINE BLEEDING: ICD-10-CM

## 2022-01-22 LAB
ALBUMIN SERPL BCP-MCNC: 3.8 G/DL (ref 3.5–5)
ALP SERPL-CCNC: 64 U/L (ref 34–104)
ALT SERPL W P-5'-P-CCNC: 19 U/L (ref 7–52)
ANION GAP SERPL CALCULATED.3IONS-SCNC: 7 MMOL/L (ref 4–13)
AST SERPL W P-5'-P-CCNC: 14 U/L (ref 13–39)
BILIRUB SERPL-MCNC: 0.72 MG/DL (ref 0.2–1)
BUN SERPL-MCNC: 11 MG/DL (ref 5–25)
CALCIUM SERPL-MCNC: 8.7 MG/DL (ref 8.4–10.2)
CHLORIDE SERPL-SCNC: 105 MMOL/L (ref 96–108)
CO2 SERPL-SCNC: 25 MMOL/L (ref 21–32)
CREAT SERPL-MCNC: 0.74 MG/DL (ref 0.6–1.3)
ERYTHROCYTE [DISTWIDTH] IN BLOOD BY AUTOMATED COUNT: 12 % (ref 11.6–15.1)
EST. AVERAGE GLUCOSE BLD GHB EST-MCNC: 103 MG/DL
GFR SERPL CREATININE-BSD FRML MDRD: 96 ML/MIN/1.73SQ M
GLUCOSE P FAST SERPL-MCNC: 86 MG/DL (ref 65–99)
HBA1C MFR BLD: 5.2 %
HCT VFR BLD AUTO: 43.3 % (ref 34.8–46.1)
HGB BLD-MCNC: 14.4 G/DL (ref 11.5–15.4)
MCH RBC QN AUTO: 31.2 PG (ref 26.8–34.3)
MCHC RBC AUTO-ENTMCNC: 33.3 G/DL (ref 31.4–37.4)
MCV RBC AUTO: 94 FL (ref 82–98)
PLATELET # BLD AUTO: 253 THOUSANDS/UL (ref 149–390)
PMV BLD AUTO: 10.5 FL (ref 8.9–12.7)
POTASSIUM SERPL-SCNC: 3.4 MMOL/L (ref 3.5–5.3)
PROT SERPL-MCNC: 6.5 G/DL (ref 6.4–8.4)
RBC # BLD AUTO: 4.61 MILLION/UL (ref 3.81–5.12)
SODIUM SERPL-SCNC: 137 MMOL/L (ref 135–147)
WBC # BLD AUTO: 7.43 THOUSAND/UL (ref 4.31–10.16)

## 2022-01-22 PROCEDURE — 85027 COMPLETE CBC AUTOMATED: CPT

## 2022-01-22 PROCEDURE — 36415 COLL VENOUS BLD VENIPUNCTURE: CPT

## 2022-01-22 PROCEDURE — 83036 HEMOGLOBIN GLYCOSYLATED A1C: CPT

## 2022-01-22 PROCEDURE — 80053 COMPREHEN METABOLIC PANEL: CPT

## 2022-01-27 ENCOUNTER — TELEPHONE (OUTPATIENT)
Dept: OBGYN CLINIC | Facility: MEDICAL CENTER | Age: 47
End: 2022-01-27

## 2022-01-27 ENCOUNTER — PREP FOR PROCEDURE (OUTPATIENT)
Dept: OBGYN CLINIC | Facility: MEDICAL CENTER | Age: 47
End: 2022-01-27

## 2022-01-27 DIAGNOSIS — N93.9 ABNORMAL UTERINE BLEEDING (AUB): Primary | ICD-10-CM

## 2022-01-27 RX ORDER — CEFAZOLIN SODIUM 2 G/50ML
2000 SOLUTION INTRAVENOUS ONCE
Status: CANCELLED | OUTPATIENT
Start: 2022-01-27 | End: 2022-01-27

## 2022-01-27 NOTE — TELEPHONE ENCOUNTER
----- Message from Gaurav Flowers MD sent at 1/25/2022 11:29 AM EST -----  Regarding: schedule surgery  Surgery schedule request:  Robot assisted total laparoscopic hysterectomy bilateral salpingectomy cystoscopy  Abnormal uterine bleeding  3 hours

## 2022-01-28 ENCOUNTER — TELEPHONE (OUTPATIENT)
Dept: LABOR AND DELIVERY | Facility: HOSPITAL | Age: 47
End: 2022-01-28

## 2022-01-28 DIAGNOSIS — N92.1 MENORRHAGIA WITH IRREGULAR CYCLE: Primary | ICD-10-CM

## 2022-01-28 RX ORDER — TRANEXAMIC ACID 650 1/1
1300 TABLET ORAL EVERY 8 HOURS
Qty: 30 TABLET | Refills: 0 | Status: SHIPPED | OUTPATIENT
Start: 2022-01-28 | End: 2022-02-02

## 2022-01-28 NOTE — TELEPHONE ENCOUNTER
[facility]      Call returned to patient  She is experiencing a heavy period, had recently stopped norethindrone acetate for heavy menstrual bleeding due to headaches and was planning definitive surgical management with hysterectomy  She has recently had mild case of COVID (currently on Day 10) so will have to delay surgery  Recommendation:  - Restart norethindrone acetate 5-15 mg daily as tolerated  - Start Lysteda 1300 mg TID x 5 days (script sent to pharmacy)  - Patient was given precautions regarding when to present to ER if heavy bleeding persists  - We reviewed options for moving forward such as inserting a levonorgestrel IUD as a bridge to surgery  She has an appointment on Monday to discuss further      Karla Parikh MD  [facility] [address] [place] [address]  01/28/22 11:56 AM

## 2022-01-31 ENCOUNTER — ANNUAL EXAM (OUTPATIENT)
Dept: OBGYN CLINIC | Facility: MEDICAL CENTER | Age: 47
End: 2022-01-31
Payer: COMMERCIAL

## 2022-01-31 VITALS
SYSTOLIC BLOOD PRESSURE: 130 MMHG | BODY MASS INDEX: 42.91 KG/M2 | DIASTOLIC BLOOD PRESSURE: 80 MMHG | HEIGHT: 66 IN | WEIGHT: 267 LBS

## 2022-01-31 DIAGNOSIS — N93.9 ABNORMAL UTERINE BLEEDING: Primary | ICD-10-CM

## 2022-01-31 PROCEDURE — 99213 OFFICE O/P EST LOW 20 MIN: CPT | Performed by: STUDENT IN AN ORGANIZED HEALTH CARE EDUCATION/TRAINING PROGRAM

## 2022-01-31 NOTE — PROGRESS NOTES
[facility] [address] [place] [address]    Assessment/Plan: Dustin Amador is a 48 y o  [id number] who presents in the setting of acute heavy menses in the context of known AUB, was planning hysterectomy  Abnormal uterine bleeding  - We reviewed her course  She was planning robot assisted TLH BS for AUB but recently contracted COVID-19 and has to wait until elective surgery can be performed  - She had a benign EMB in January 2021  She had good response on oral progestin but had to discontinue due to headaches  She stopped at the end of November and is having a very heavy period     - We reviewed other options as a bridge to [facility]  She is interested in trying Mirena IUD as previously discussed  - - We reviewed risks, benefits, and alternatives to levonorgestrel IUD in detail   - We discussed risks including pregnancy (0 1%), expulsion (approximately 3-6%), irregular bleeding, and uterine perforation (1 in 1000)  - We discussed different formulations and length of approval (e g  Mirena/Liletta 52 mg approved for 6 years  - Gave Prior authorization worksheet and she will return to office for insertion  Diagnoses and all orders for this visit:    Abnormal uterine bleeding  -     Mammo screening bilateral w 3d & cad; Future          Subjective: Dustin Amador is a 48 y o  [id number] female  CC: Heavy menses    HPI: Yadiraenrrique Anderson presents with heavy menses  She called on Friday and was soaking more than a pad per hour  She started Lysteda on Friday and Aygestin  She stopped Aygestin due to headache  She is noting some improvement in bleeding today  She is now changing a pad every 4-5 hours insetad of every 2  She denies dizziness or lightheadedness  ROS: Review of Systems   Constitutional: Negative for chills and fever  Respiratory: Negative for cough and shortness of breath  Cardiovascular: Negative for chest pain and leg swelling     Gastrointestinal: Negative for abdominal pain, nausea and vomiting  Genitourinary: Negative for dysuria, frequency and urgency  Neurological: Negative for dizziness, light-headedness and headaches  PFSH: The following portions of the patient's history were reviewed and updated as appropriate: allergies, current medications, past family history, past medical history, obstetric history, gynecologic history, past social history, past surgical history and problem list        Objective:  /80   Ht 5' 6" (1 676 m)   Wt 121 kg (267 lb)   LMP 01/26/2022   BMI 43 09 kg/m²    Physical Exam  Constitutional:       Appearance: Normal appearance  Cardiovascular:      Rate and Rhythm: Normal rate  Pulmonary:      Effort: Pulmonary effort is normal    Neurological:      Mental Status: She is alert     Psychiatric:         Mood and Affect: Mood normal          Behavior: Behavior normal            Ham Monae MD  [address] [place] [address]  1/31/2022 2:50 PM

## 2022-01-31 NOTE — ASSESSMENT & PLAN NOTE
- We reviewed her course  She was planning robot assisted TLH BS for AUB but recently contracted COVID-19 and has to wait until elective surgery can be performed  - She had a benign EMB in January 2021  She had good response on oral progestin but had to discontinue due to headaches  She stopped at the end of November and is having a very heavy period     - We reviewed other options as a bridge to [facility]  She is interested in trying Mirena IUD as previously discussed  - - We reviewed risks, benefits, and alternatives to levonorgestrel IUD in detail   - We discussed risks including pregnancy (0 1%), expulsion (approximately 3-6%), irregular bleeding, and uterine perforation (1 in 1000)  - We discussed different formulations and length of approval (e g  Mirena/Liletta 52 mg approved for 6 years  - Gave Prior authorization worksheet and she will return to office for insertion

## 2022-02-09 ENCOUNTER — ANNUAL EXAM (OUTPATIENT)
Dept: OBGYN CLINIC | Facility: MEDICAL CENTER | Age: 47
End: 2022-02-09
Payer: COMMERCIAL

## 2022-02-09 VITALS
DIASTOLIC BLOOD PRESSURE: 90 MMHG | WEIGHT: 267 LBS | SYSTOLIC BLOOD PRESSURE: 130 MMHG | HEIGHT: 66 IN | BODY MASS INDEX: 42.91 KG/M2

## 2022-02-09 DIAGNOSIS — Z01.419 ENCOUNTER FOR GYNECOLOGICAL EXAMINATION: Primary | ICD-10-CM

## 2022-02-09 DIAGNOSIS — Z30.430 ENCOUNTER FOR IUD INSERTION: ICD-10-CM

## 2022-02-09 DIAGNOSIS — Z12.31 ENCOUNTER FOR SCREENING MAMMOGRAM FOR MALIGNANT NEOPLASM OF BREAST: ICD-10-CM

## 2022-02-09 PROCEDURE — S0612 ANNUAL GYNECOLOGICAL EXAMINA: HCPCS | Performed by: STUDENT IN AN ORGANIZED HEALTH CARE EDUCATION/TRAINING PROGRAM

## 2022-02-09 PROCEDURE — 58300 INSERT INTRAUTERINE DEVICE: CPT | Performed by: STUDENT IN AN ORGANIZED HEALTH CARE EDUCATION/TRAINING PROGRAM

## 2022-02-09 NOTE — PROGRESS NOTES
[facility] [address] [place] [address]    ASSESSMENT/PLAN: Emmie Washington is a 48 y o  [id number] who presents for annual gynecologic exam     Encounter for routine gynecologic examination  - Routine well woman exam completed today  - Cervical Cancer Screening: Current ASCCP Guidelines reviewed  Last Pap: 01/19/2021  Next Pap Due: 2026   - HPV Vaccination status: Not immunized  - STI screening offered including HIV: Declines  - Contraceptive counseling discussed  Mirena IUD inserted today for AUB  - Breast Cancer Screening: Last Mammogram 05/10/2021, ordered for 2022  Additional problems addressed at this visit:  1  Encounter for gynecological examination    2  Encounter for screening mammogram for malignant neoplasm of breast  -     Mammo screening bilateral w 3d & cad; Future; Expected date: 02/09/2022    3  Encounter for IUD insertion  -     levonorgestrel (MIRENA) IUD 20 mcg/day          CC:  Annual Gynecologic Examination    HPI: Emmie Washington is a 48 y o  [id number] who presents for annual gynecologic examination  She reports no new changes to her health  She reports no breast concerns  She gets heavy periods and was planning for [facility] BS for AUB but it was postponed due to her recent COVID infection  She is amenable to trying Mirena IUD in the interim  She has no vaginal discharge, vulvar or vaginal lesions, pelvic pain  She has no sexual health concerns and is currently sexually active with one male partner  She has no symptoms of pelvic organ prolapse, urinary, or fecal incontinence  She denies intimate partner violence  The following portions of the patient's history were reviewed and updated as appropriate: allergies, current medications, past family history, past medical history, obstetric history, gynecologic history, past social history, past surgical history and problem list     Review of Systems   Constitutional: Negative for chills and fever  Respiratory: Negative for cough and shortness of breath  Cardiovascular: Negative for chest pain and leg swelling  Gastrointestinal: Negative for abdominal pain, nausea and vomiting  Genitourinary: Negative for dysuria, frequency and urgency  Neurological: Negative for dizziness, light-headedness and headaches  Objective:  /90   Ht 5' 6" (1 676 m)   Wt 121 kg (267 lb)   LMP 01/26/2022   BMI 43 09 kg/m²    Physical Exam  Exam conducted with a chaperone present  Constitutional:       Appearance: Normal appearance  HENT:      Head: Normocephalic and atraumatic  Cardiovascular:      Rate and Rhythm: Normal rate  Pulmonary:      Effort: Pulmonary effort is normal    Chest:   Breasts: Breasts are symmetrical       Right: Normal  No swelling, bleeding, inverted nipple, mass, nipple discharge, skin change, tenderness or axillary adenopathy  Left: Normal  No swelling, bleeding, inverted nipple, mass, nipple discharge, skin change, tenderness or axillary adenopathy  Abdominal:      General: There is no distension  Tenderness: There is no abdominal tenderness  There is no guarding  Genitourinary:     Exam position: Lithotomy position  Pubic Area: No rash  Labia:         Right: No rash, tenderness or lesion  Left: No rash, tenderness or lesion  Urethra: No prolapse, urethral swelling or urethral lesion  Vagina: Normal  No vaginal discharge, erythema, tenderness, bleeding or lesions  Cervix: No cervical motion tenderness, discharge, friability or erythema  Uterus: Not enlarged, not tender and no uterine prolapse  Adnexa:         Right: No mass, tenderness or fullness  Left: No mass, tenderness or fullness  Lymphadenopathy:      Upper Body:      Right upper body: No axillary adenopathy  Left upper body: No axillary adenopathy  Lower Body: No right inguinal adenopathy  No left inguinal adenopathy  Neurological:      Mental Status: She is alert               Army MD Kevin  [facility]  02/09/22 12:38 PM

## 2022-02-09 NOTE — PROGRESS NOTES
[facility] [address] [place] [address]    Iud insertions    Date/Time: 2/9/2022 12:41 PM  Performed by: Cameron Ramsey MD  Authorized by: Cameron Ramsey MD   Universal Protocol:  Consent: Written consent obtained  Risks and benefits: risks, benefits and alternatives were discussed  Consent given by: patient  Time out: Immediately prior to procedure a "time out" was called to verify the correct patient, procedure, equipment, support staff and site/side marked as required    Timeout called at: 2/9/2022 11:45 AM   Patient understanding: patient states understanding of the procedure being performed  Patient consent: the patient's understanding of the procedure matches consent given  Procedure consent: procedure consent matches procedure scheduled  Relevant documents: relevant documents present and verified  Test results: test results available and properly labeled  Required items: required blood products, implants, devices, and special equipment available  Patient identity confirmed: verbally with patient        Procedure:     Pelvic exam performed: yes      Negative GC/chlamydia test: yes      Negative urine pregnancy test: yes      Cervix cleaned and prepped: yes      Speculum placed in vagina: yes      Tenaculum applied to cervix: yes      Uterus sounded: yes      Uterus sound depth (cm):  9    IUD inserted with no complications: yes      IUD type:  Mirena    Strings trimmed: yes    Post-procedure:     Patient tolerated procedure well: yes      Patient will follow up after next period: yes        Ronnie Luo MD  [address] [place] [address]  2/9/2022 12:42 PM

## 2022-02-28 DIAGNOSIS — N92.0 MENORRHAGIA WITH REGULAR CYCLE: Primary | ICD-10-CM

## 2022-02-28 RX ORDER — TRANEXAMIC ACID 650 1/1
1300 TABLET ORAL 3 TIMES DAILY
Qty: 30 TABLET | Refills: 11 | Status: SHIPPED | OUTPATIENT
Start: 2022-02-28 | End: 2022-03-05

## 2022-04-26 ENCOUNTER — OFFICE VISIT (OUTPATIENT)
Dept: FAMILY MEDICINE CLINIC | Facility: CLINIC | Age: 47
End: 2022-04-26
Payer: COMMERCIAL

## 2022-04-26 ENCOUNTER — TELEPHONE (OUTPATIENT)
Dept: PSYCHIATRY | Facility: CLINIC | Age: 47
End: 2022-04-26

## 2022-04-26 ENCOUNTER — OFFICE VISIT (OUTPATIENT)
Dept: PSYCHIATRY | Facility: CLINIC | Age: 47
End: 2022-04-26

## 2022-04-26 VITALS
HEART RATE: 89 BPM | HEIGHT: 66 IN | OXYGEN SATURATION: 98 % | TEMPERATURE: 97.8 F | DIASTOLIC BLOOD PRESSURE: 98 MMHG | BODY MASS INDEX: 34.39 KG/M2 | SYSTOLIC BLOOD PRESSURE: 138 MMHG | WEIGHT: 214 LBS

## 2022-04-26 DIAGNOSIS — F39 UNSPECIFIED MOOD (AFFECTIVE) DISORDER (HCC): ICD-10-CM

## 2022-04-26 DIAGNOSIS — F41.1 GAD (GENERALIZED ANXIETY DISORDER): Primary | ICD-10-CM

## 2022-04-26 DIAGNOSIS — M54.2 NECK PAIN ON RIGHT SIDE: ICD-10-CM

## 2022-04-26 DIAGNOSIS — M25.511 ACUTE PAIN OF RIGHT SHOULDER: Primary | ICD-10-CM

## 2022-04-26 DIAGNOSIS — Z86.16 PERSONAL HISTORY OF COVID-19: ICD-10-CM

## 2022-04-26 PROCEDURE — 99213 OFFICE O/P EST LOW 20 MIN: CPT | Performed by: FAMILY MEDICINE

## 2022-04-26 RX ORDER — MELOXICAM 15 MG/1
15 TABLET ORAL DAILY
Qty: 30 TABLET | Refills: 1 | Status: SHIPPED | OUTPATIENT
Start: 2022-04-26 | End: 2022-04-29 | Stop reason: ALTCHOICE

## 2022-04-26 RX ORDER — SERTRALINE HYDROCHLORIDE 100 MG/1
TABLET, FILM COATED ORAL
Qty: 90 TABLET | Refills: 0 | Status: SHIPPED | OUTPATIENT
Start: 2022-04-26 | End: 2022-06-28 | Stop reason: SDUPTHER

## 2022-04-26 RX ORDER — METHOCARBAMOL 750 MG/1
750 TABLET, FILM COATED ORAL EVERY 12 HOURS PRN
Qty: 10 TABLET | Refills: 0 | Status: SHIPPED | OUTPATIENT
Start: 2022-04-26 | End: 2022-05-24 | Stop reason: ALTCHOICE

## 2022-04-26 NOTE — PROGRESS NOTES
Assessment/Plan:       Diagnoses and all orders for this visit:    Acute pain of right shoulder  -     Ambulatory Referral to Physical Therapy; Future  -     meloxicam (Mobic) 15 mg tablet; Take 1 tablet (15 mg total) by mouth daily  -     methocarbamol (Robaxin-750) 750 mg tablet; Take 1 tablet (750 mg total) by mouth every 12 (twelve) hours as needed for muscle spasms    Neck pain on right side  -     Ambulatory Referral to Physical Therapy; Future  -     meloxicam (Mobic) 15 mg tablet; Take 1 tablet (15 mg total) by mouth daily  -     methocarbamol (Robaxin-750) 750 mg tablet; Take 1 tablet (750 mg total) by mouth every 12 (twelve) hours as needed for muscle spasms    Personal history of COVID-19  Comments:  1/12/2022 - fully recovered          Subjective:   Chief Complaint   Patient presents with    Neck Pain     Pain started in neck about 3 weeks ago and has traveled down shoulder into the arm and fingers whereas they get tingly and numb  No refills needed today  Patient ID: Екатерина Palacios is a 48 y o  female      Same day sick appt for c/o "shoulder pain, arm and hands gets tingly"  States she "woke up 3 weeks ago with a stiff neck, and it's just progressed through my shoulder, down my arm"- all on the right per pt  No benefit with icy hot, advil, heat, ice  "Just thought I slept wrong when it first happened, but now my fingers feel tingly and right now my arm is pulsating"  No injury or trauma  "Over the weekend I noticed, the more I used it it's almost a burning sensation inside and hurts to push on that arm- can't lay on that side now"  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 "strictly behind the desk on the computer and on the phone"  Has noticed that she has to stand at work now due to [employer] that computer is on- "if have to hold my hand/arm up that high for too long (from sitting position), it burns"  No recent vaccines administered in either arm      The following portions of the patient's history were reviewed and updated as appropriate: allergies, current medications, past family history, past medical history, past social history, past surgical history and problem list     Review of Systems      Objective:      /98 (BP Location: Left arm, Patient Position: Sitting, Cuff Size: Large)   Pulse 89   Temp 97 8 °F (36 6 °C) (Tympanic)   Ht 5' 6" (1 676 m)   Wt 97 1 kg (214 lb)   SpO2 98%   BMI 34 54 kg/m²          Physical Exam  Constitutional:       General: She is not in acute distress  Appearance: She is well-developed  She is not ill-appearing, toxic-appearing or diaphoretic  HENT:      Head: Normocephalic and atraumatic  Mouth/Throat:      Pharynx: Uvula midline  Neck:      Trachea: Phonation normal    Pulmonary:      Effort: Pulmonary effort is normal    Musculoskeletal:      Right shoulder: Tenderness present  No swelling, deformity, effusion, laceration, bony tenderness or crepitus  Decreased range of motion  Normal strength  Normal pulse  Skin:     Coloration: Skin is not pale  Neurological:      Mental Status: She is alert and oriented to person, place, and time  Psychiatric:         Behavior: Behavior is cooperative
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
## 2022-04-26 NOTE — BH TREATMENT PLAN
TREATMENT PLAN        Kevin Graham    Name and Date of Birth: Lisa Fuentes 48 y o  1975  Date of Treatment Plan: April 26, 2022  Diagnosis/Diagnoses:    1  CIPRIANO (generalized anxiety disorder)    2  Unspecified mood (affective) disorder ([address] )        Strengths/Personal Resources for Self-Care: supportive family, ability to communicate well, motivation for treatment    Area/Areas of need: "anxiety, overly emotional"    Long Term Goal: Cry less, be less emotional  Target Date: 6 months - October 26, 2022  Person/Persons responsible for completion of goal: Jose Piña and Montse Jones MD     Short Term Objective (s) - How will we reach this goal?:   Take medications as prescribed  Attend psychiatry appointments regularly  Start psychotherapy  Target Date: 2 months - June 26, 2022  Person/Persons Responsible for Completion of Goal: Jammie     Progress Towards Goals: Continuing treatment    Treatment Modality: medication management every 1-3 months as needed  Review due 180 days from date of this plan: October 23, 2022   Expected length of service: Ongoing treatment    My physician and I have developed this plan together, and I agree to work on the goals and objectives  I understand the treatment goals that were developed for my treatment  The treatment plan was created between Montse Jones MD and Lisa Alfredo on 04/26/22 at 3:59 PM but not signed at the time of the visit due to [address]  The plan was reviewed, and verbal consent was given

## 2022-04-26 NOTE — PATIENT INSTRUCTIONS
Start Zoloft 50mg daily for two weeks, then increase to 100mg daily  Continue 300mg XL daily  Please call the office nursing staff for medication issues including refills, problems getting medications, bothersome side effects, etc at [phone]      Please return for a follow up appointment as discussed  If you are running late or are unable to attend your appointment, please call our [place] office at [phone], or if you were seen in the [place] office, please call [phone]  If you have thoughts of harming yourself or are otherwise in psychological crisis, do not hesitate to contact your [facility] hotline, or go to the nearest emergency room    [facility] Crisis: [address] Crisis: [address] Crisis: 1-866-332-158.241.9930  [facility] Crisis: [address] Crisis: [address] Crisis: [phone]  [address] Crisis: [address] Suicide Prevention Hotline: 3-574.165.7130

## 2022-04-27 ENCOUNTER — TELEPHONE (OUTPATIENT)
Dept: FAMILY MEDICINE CLINIC | Facility: CLINIC | Age: 47
End: 2022-04-27

## 2022-04-27 NOTE — TELEPHONE ENCOUNTER
----- Message from Tyler Ludwig sent at 4/27/2022  8:35 AM EDT -----  Regarding: shoulder pain  Dr Kelly Cazares,    i took the anti-inflammatory meds and the muscle relaxer last night and the pins and needles eased up  Now today i'm back at work typing and i can feel my neck and arm tensing up and my hand has pins and needles again  Would a Cortisone shot help this while i'm waiting for PT?  my first appt for PT is 5/5 but i'm a little concerned cause this pain is making it hard to do my job and i do not want to take off work unless totally necessary  What are your thoughts?

## 2022-04-29 DIAGNOSIS — M25.511 ACUTE PAIN OF RIGHT SHOULDER: ICD-10-CM

## 2022-04-29 DIAGNOSIS — M54.2 NECK PAIN ON RIGHT SIDE: Primary | ICD-10-CM

## 2022-04-29 RX ORDER — DICLOFENAC SODIUM 75 MG/1
75 TABLET, DELAYED RELEASE ORAL 2 TIMES DAILY
Qty: 60 TABLET | Refills: 0 | Status: SHIPPED | OUTPATIENT
Start: 2022-04-29 | End: 2022-05-24 | Stop reason: ALTCHOICE

## 2022-04-29 RX ORDER — METHYLPREDNISOLONE 4 MG/1
TABLET ORAL
Qty: 21 EACH | Refills: 0 | Status: SHIPPED | OUTPATIENT
Start: 2022-04-29 | End: 2022-05-24 | Stop reason: ALTCHOICE

## 2022-04-29 NOTE — TELEPHONE ENCOUNTER
Genny Kruger is aware of recommendations  She stated the Mobic is not helping her and would like to know if there is anything else you can prescribe for the pain?   Please advise  Thank you

## 2022-04-29 NOTE — TELEPHONE ENCOUNTER
Please advise pt that I escribed Voltaren to take instead of Mobic, and I also escribed steroid taper

## 2022-04-29 NOTE — TELEPHONE ENCOUNTER
Please advise pt that further eval and treatment with spine and pain specialist would not be warranted until she has tried PT, unless there are neurological abnormalities on exam, then specialist eval would be appropriate without PT first

## 2022-05-05 ENCOUNTER — EVALUATION (OUTPATIENT)
Dept: PHYSICAL THERAPY | Facility: MEDICAL CENTER | Age: 47
End: 2022-05-05
Payer: COMMERCIAL

## 2022-05-05 DIAGNOSIS — M25.511 ACUTE PAIN OF RIGHT SHOULDER: Primary | ICD-10-CM

## 2022-05-05 DIAGNOSIS — M54.2 NECK PAIN ON RIGHT SIDE: ICD-10-CM

## 2022-05-05 PROCEDURE — 97140 MANUAL THERAPY 1/> REGIONS: CPT | Performed by: PHYSICAL THERAPIST

## 2022-05-05 NOTE — PROGRESS NOTES
PT Evaluation     Today's date: 2022  Patient name: Akash Hamilton  : 1975  MRN: [id number]  Referring provider: Marcus Galvan DO  Dx:   Encounter Diagnoses   Name Primary?  Acute pain of right shoulder     Neck pain on right side                   Assessment  Assessment details: Akash Hamilton is a 47 y/o female who presents with complaints of acute neck pain  The patient's greatest concerns are not being able to sit at a desk and work without increased R shoulder and UE pain that increases throughout the day  Primary movement impairment diagnosis of cervical hypomobility, resulting in pathoanatomical symptoms of acute pain of right shoulder and neck, which limits her ability to perform functional activities without pain  Pt  will benefit from skilled PT services that includes manual therapy techniques to enhance tissue extensibility, neuromuscular re-education to facilitate motor control, therapeutic exercise to increase functional mobility, and modalities prn to reduce pain and inflammation    Impairments: abnormal muscle firing, abnormal or restricted ROM, activity intolerance, impaired physical strength, lacks appropriate home exercise program, pain with function, poor posture  and poor body mechanics  Understanding of Dx/Px/POC: good   Prognosis: good    Goals  Impairment Goals  - Pt I with initial HEP in 1-2 visits  - Improve cervical ROM to [facility] in 4-6 weeks  - Increase strength to 5/5 in all affected areas in 4-6 weeks    Functional Goals  - Increase Functional Status Measure to: 79  - Patient will be independent with comprehensive HEP in 6 weeks  - Patient will be able to work without difficulty in 6 weeks  - Patient will be able to reach for object overhead with min to difficulty in 6 weeks  - Patient will be able to lift/carry objects without provocation of symptoms in 6 weeks        Plan  Patient would benefit from: PT eval  Planned modality interventions: thermotherapy: hydrocollator packs and traction  Planned therapy interventions: joint mobilization, manual therapy, neuromuscular re-education, strengthening, stretching, patient education, postural training, therapeutic exercise, therapeutic activities, home exercise program, graded activity, flexibility, functional ROM exercises, abdominal trunk stabilization and body mechanics training  Frequency: 2x week  Duration in weeks: 6  Treatment plan discussed with: patient      Subjective Evaluation    History of Present Illness  Mechanism of injury: Patient states that she woke up c/ a stiff neck approximately 3 weeks ago  She does not recall a specific IFRAH  She notes that she began feeling tingling and numbness in the right shoulder and into all 5 fingers and the right palm  This sensation increases while working and decreases by morning  She reports a hx of having allergy headaches, but no increase in headaches since this incident  Patient denies dizziness and other red flags  She works at a desk all day and would like to be able to work and resume all activities without pain    Pain  Current pain rating: 3  At best pain ratin  At worst pain ratin  Quality: radiating, throbbing and tight  Relieving factors: medications and heat  Aggravating factors: lifting    Treatments  Current treatment: physical therapy  Patient Goals  Patient goals for therapy: increased strength, independence with ADLs/IADLs, decreased pain, increased motion and return to sport/leisure activities        Objective     Concurrent Complaints  Negative for night pain, disturbed sleep, dizziness, faints, headaches, nausea/motion sickness, tinnitus, trouble swallowing, difficulty breathing, shortness of breath, respiratory pain, visual change, cardiac problem, kidney problem, gallbladder problem, stomach problem, ulcer, appendix problem, spleen problem, pancreas problem, history of cancer, history of trauma and infection    Static Posture Head  Forward  Shoulders  Rounded  Postural Observations  Seated posture: poor  Standing posture: fair        Palpation     Right   Hypertonic in the upper trapezius  Tenderness of the levator scapulae and suboccipitals  Trigger point to upper trapezius  Tenderness   Cervical Spine   Tenderness in the spinous process (C5-C7)  Neurological Testing     Sensation   Cervical/Thoracic   Left   Intact: light touch    Right   Paresthesia: light touch    Comments   Right light touch: C5-C7 dermatomes       Reflexes   Left   Biceps (C5/C6): normal (2+)  Brachioradialis (C6): normal (2+)  Triceps (C7): normal (2+)    Right   Biceps (C5/C6): normal (2+)  Brachioradialis (C6): normal (2+)  Triceps (C7): normal (2+)    Active Range of Motion   Cervical/Thoracic Spine       Cervical    Flexion:  WFL  Extension:  with pain Restriction level: moderate  Left lateral flexion:  with pain Restriction level: moderate  Right lateral flexion:  with pain Restriction level minimal  Left rotation:  with pain Restriction level: minimal  Right rotation:  with pain Restriction level: moderate    Thoracic    Extension:  Restriction level: moderate    Passive Range of Motion     Additional Passive Range of Motion Details  PAROM:  Pain and hypomobility c/ UPAs at C6-7 on the right    Joint Play   Joints within functional limits: C2, C3 and C4     Hypomobile: C5, C6, C7 and T1     Pain: C6, C7 and T1     Strength/Myotome Testing   Cervical Spine   Neck flexion: 5    Left   Interossei strength (t1): 5  Neck lateral flexion (C3): 5    Right   Interossei strength (t1): 5  Neck lateral flexion (C3): 5    Left Shoulder     Planes of Motion   Abduction: 5     Isolated Muscles   Upper trapezius: 5     Right Shoulder     Planes of Motion   Abduction: 3+     Isolated Muscles   Upper trapezius: 5     Left Elbow   Flexion: 5  Extension: 5    Right Elbow   Flexion: 4  Extension: 4    Left Wrist/Hand   Wrist flexion: 5  Thumb extension: 5    Right Wrist/Hand   Wrist flexion: 4  Thumb extension: 5    Tests   Cervical   Positive cervical distraction test   Negative alar ligament test and Sharp-Lisa test      Right   Positive Spurling's Test A and cervical flexion-rotation test      Right Shoulder   Positive [id number]       General Comments:      Shoulder Comments   Full shoulder ROM bilaterally  Neuro Exam:     Headaches   Patient reports headaches: No         Precautions:  N/A    Manuals 5/6            UPAs C5-7 R AZ            Manual traction AZ                                      Neuro Re-Ed             Scap ret                                       Ther Ex             Chin tucks                                                                                                        Ther Activity                                       Gait Training                                       Modalities             [facility] [place]  5/5/2022,3:06 PM

## 2022-05-06 PROCEDURE — 97161 PT EVAL LOW COMPLEX 20 MIN: CPT | Performed by: PHYSICAL THERAPIST

## 2022-05-09 ENCOUNTER — OFFICE VISIT (OUTPATIENT)
Dept: PHYSICAL THERAPY | Facility: MEDICAL CENTER | Age: 47
End: 2022-05-09
Payer: COMMERCIAL

## 2022-05-09 DIAGNOSIS — M54.2 NECK PAIN ON RIGHT SIDE: ICD-10-CM

## 2022-05-09 DIAGNOSIS — M25.511 ACUTE PAIN OF RIGHT SHOULDER: Primary | ICD-10-CM

## 2022-05-09 PROCEDURE — 97140 MANUAL THERAPY 1/> REGIONS: CPT | Performed by: PHYSICAL THERAPIST

## 2022-05-09 PROCEDURE — 97012 MECHANICAL TRACTION THERAPY: CPT | Performed by: PHYSICAL THERAPIST

## 2022-05-09 PROCEDURE — 97110 THERAPEUTIC EXERCISES: CPT | Performed by: PHYSICAL THERAPIST

## 2022-05-09 NOTE — PROGRESS NOTES
Daily Note     Today's date: 2022  Patient name: Amador Schaefer  : 1975  MRN: [id number]  Referring provider: Clara Teague DO  Dx:   Encounter Diagnosis     ICD-10-CM    1  Acute pain of right shoulder  M25 511    2  Neck pain on right side  M54 2                   Subjective:  Patient states that she is doing better  Objective: See treatment diary below  Assessment:  Patient presents c/ decreased R UE pain and decreased neck pain  She demonstrates TTP t/o R UT m c/ trigger point that radiates down the arm  No pain post manuals and traction  Tolerated treatment well  Patient would benefit from continued PT  Plan: Continue per plan of care        Precautions:  N/A    Manuals            UPAs C5-7 R AZ AZ           Manual traction AZ AZ           Median n glides  AZ           STM/MFR  AZ           UT release  AZ           Neuro Re-Ed             Scap ret  20x5s                                     Ther Ex             Chin tucks  10x                                                                                                      Ther Activity                                       Gait Training                                       Modalities             MH  10'           TX  25/10 int 10'

## 2022-05-12 ENCOUNTER — OFFICE VISIT (OUTPATIENT)
Dept: PHYSICAL THERAPY | Facility: MEDICAL CENTER | Age: 47
End: 2022-05-12
Payer: COMMERCIAL

## 2022-05-12 DIAGNOSIS — M25.511 ACUTE PAIN OF RIGHT SHOULDER: ICD-10-CM

## 2022-05-12 DIAGNOSIS — M54.2 NECK PAIN ON RIGHT SIDE: ICD-10-CM

## 2022-05-12 DIAGNOSIS — M25.511 ACUTE PAIN OF RIGHT SHOULDER: Primary | ICD-10-CM

## 2022-05-12 PROCEDURE — 97140 MANUAL THERAPY 1/> REGIONS: CPT | Performed by: PHYSICAL THERAPIST

## 2022-05-12 NOTE — PROGRESS NOTES
Daily Note     Today's date: 2022  Patient name: Zulema Garcia  : 1975  MRN: [id number]  Referring provider: Tor Weldon DO  Dx:   Encounter Diagnosis     ICD-10-CM    1  Acute pain of right shoulder  M25 511    2  Neck pain on right side  M54 2                   Subjective:  Patient states that she was feeling well on Monday and then by , her symptoms increased after working all day Monday  She discontinued taking the Methylprednisolone on   Objective: See treatment diary below  Assessment:  Patient presents today c/ increased R sided neck and shoulder pain after working all week  She notes that when she stops typing and when she is at home, the pain subsides  She presents c/ R upper trap tightness and tenderness  R UE paresthesias t/o C5-6 dermatomes  Reflexes 2+ throughout  No myotomal weakness noted  Patient reported decreased R UE tingling/numbness post manual traction  Patient advised to be cautious of forward head positioning and posture while working  Patient will follow up c/ PT on Monday  Plan: Continue per plan of care        Precautions:  N/A    Manuals 6           UPAs C5-7 R AZ AZ AZ          Manual traction AZ AZ AZ          Median n glides  AZ           STM/MFR  AZ           UT release  AZ           Neuro Re-Ed             Scap ret  20x5s 20x5s                                    Ther Ex             Chin tucks  10x 10x                                                                                                     Ther Activity                                       Gait Training                                       Modalities             MH  10' 10'          TX  25/10 int 10'

## 2022-05-12 NOTE — TELEPHONE ENCOUNTER
Pt called that her Physical Therapist is recommending Pt does another round of her steroid pack     She is requesting a refill to   [facility]

## 2022-05-13 ENCOUNTER — APPOINTMENT (OUTPATIENT)
Dept: PHYSICAL THERAPY | Facility: MEDICAL CENTER | Age: 47
End: 2022-05-13
Payer: COMMERCIAL

## 2022-05-13 RX ORDER — METHYLPREDNISOLONE 4 MG/1
TABLET ORAL
Qty: 21 EACH | Refills: 0 | OUTPATIENT
Start: 2022-05-13

## 2022-05-13 NOTE — TELEPHONE ENCOUNTER
Patient called back, relayed that the medication was denied  She wants to know the reasoning why  She did state that PT has not seemed to be helping and has been dealing with this for weeks  When she is working her whole arm goes numb and can't work  If steroids couldn't be prescribed, hoping some type of imaging could be ordered for her  Please advise

## 2022-05-16 ENCOUNTER — OFFICE VISIT (OUTPATIENT)
Dept: PHYSICAL THERAPY | Facility: MEDICAL CENTER | Age: 47
End: 2022-05-16
Payer: COMMERCIAL

## 2022-05-16 DIAGNOSIS — M54.2 NECK PAIN ON RIGHT SIDE: ICD-10-CM

## 2022-05-16 DIAGNOSIS — M25.511 ACUTE PAIN OF RIGHT SHOULDER: Primary | ICD-10-CM

## 2022-05-16 PROCEDURE — 97012 MECHANICAL TRACTION THERAPY: CPT | Performed by: PHYSICAL THERAPIST

## 2022-05-16 PROCEDURE — 97110 THERAPEUTIC EXERCISES: CPT | Performed by: PHYSICAL THERAPIST

## 2022-05-16 PROCEDURE — 97140 MANUAL THERAPY 1/> REGIONS: CPT | Performed by: PHYSICAL THERAPIST

## 2022-05-16 NOTE — PROGRESS NOTES
Daily Note     Today's date: 2022  Patient name: Lisa Fuentes  : 1975  MRN: [id number]  Referring provider: Sharon Kuo DO  Dx:   Encounter Diagnosis     ICD-10-CM    1  Acute pain of right shoulder  M25 511    2  Neck pain on right side  M54 2                   Subjective:  Patient states that she mostly has pain c/ shrugging the right shoulder  If the arm is down by her side, she feels better  Objective: See treatment diary below  Assessment:  Patient presents c/ less tingling and numbness t/o the R UE  Her neck ROM is much improved to the right without pain today  Patient demonstrates severe myofascial restrictions t/o the right upper trap and periscapular mm  Tolerated treatment well  Patient would benefit from continued PT  Plan: Continue per plan of care        Precautions:  N/A    Manuals /6          UPAs C5-7 R AZ AZ AZ AZ         Manual traction AZ AZ AZ AZ         Median n glides  AZ           STM/MFR  AZ  AZ         UT release  AZ  AZ         Thoracic UPAs    AZ         Neuro Re-Ed             Scap ret  20x5s 20x5s 20x5s                                   Ther Ex             Chin tucks  10x 10x 2x10 5s sup                                                Ther Activity                                       Gait Training                                       Modalities             MH  10' 10' 10'         TX  25/10 int 10'  20/10 int 10'

## 2022-05-19 ENCOUNTER — OFFICE VISIT (OUTPATIENT)
Dept: PHYSICAL THERAPY | Facility: MEDICAL CENTER | Age: 47
End: 2022-05-19
Payer: COMMERCIAL

## 2022-05-19 DIAGNOSIS — M25.511 ACUTE PAIN OF RIGHT SHOULDER: Primary | ICD-10-CM

## 2022-05-19 DIAGNOSIS — M54.2 NECK PAIN ON RIGHT SIDE: ICD-10-CM

## 2022-05-19 PROCEDURE — 97112 NEUROMUSCULAR REEDUCATION: CPT | Performed by: PHYSICAL THERAPIST

## 2022-05-19 PROCEDURE — 97140 MANUAL THERAPY 1/> REGIONS: CPT | Performed by: PHYSICAL THERAPIST

## 2022-05-19 PROCEDURE — 97012 MECHANICAL TRACTION THERAPY: CPT | Performed by: PHYSICAL THERAPIST

## 2022-05-19 NOTE — PROGRESS NOTES
Daily Note     Today's date: 2022  Patient name: Lonnie Navarro  : 1975  MRN: [id number]  Referring provider: Justo Gaona DO  Dx:   Encounter Diagnosis     ICD-10-CM    1  Acute pain of right shoulder  M25 511    2  Neck pain on right side  M54 2                   Subjective:  Patient states that she is doing well  Objective: See treatment diary below  Assessment:  Patient presents c/ decreased tenderness t/o R UT and haydee-scapular mm  She did well c/ progressions to enhance scap NMRE  Tolerated treatment well  Patient would benefit from continued PT  Plan: Continue per plan of care        Precautions:  N/A    Manuals         UPAs C5-7 R AZ AZ AZ AZ AZ        Manual traction AZ AZ AZ AZ AZ        Median n glides  AZ           STM/MFR  AZ  AZ AZ        UT release  AZ  AZ AZ        Thoracic UPAs    AZ AZ        Neuro Re-Ed             Scap ret  20x5s 20x5s 20x5s 10x5s        Scapt ext     10x5s        W's     10x5s        Lawnmower     10x5s        Ther Ex             Chin tucks  10x 10x 2x10 5s sup home                                               Ther Activity                                       Gait Training                                       Modalities               10' 10' 10' 10'        TX  25/10 int 10'  20/10 int 10' 20/10 int 10'

## 2022-05-20 ENCOUNTER — APPOINTMENT (OUTPATIENT)
Dept: PHYSICAL THERAPY | Facility: MEDICAL CENTER | Age: 47
End: 2022-05-20
Payer: COMMERCIAL

## 2022-05-23 ENCOUNTER — OFFICE VISIT (OUTPATIENT)
Dept: PHYSICAL THERAPY | Facility: MEDICAL CENTER | Age: 47
End: 2022-05-23
Payer: COMMERCIAL

## 2022-05-23 DIAGNOSIS — M25.511 ACUTE PAIN OF RIGHT SHOULDER: Primary | ICD-10-CM

## 2022-05-23 DIAGNOSIS — M54.2 NECK PAIN ON RIGHT SIDE: ICD-10-CM

## 2022-05-23 PROCEDURE — 97140 MANUAL THERAPY 1/> REGIONS: CPT | Performed by: PHYSICAL THERAPIST

## 2022-05-23 PROCEDURE — 97012 MECHANICAL TRACTION THERAPY: CPT | Performed by: PHYSICAL THERAPIST

## 2022-05-23 NOTE — PROGRESS NOTES
Daily Note     Today's date: 2022  Patient name: Yamilet Harrison  : 1975  MRN: [id number]  Referring provider: Ernst Grijalva DO  Dx:   Encounter Diagnosis     ICD-10-CM    1  Acute pain of right shoulder  M25 511    2  Neck pain on right side  M54 2                   Subjective:  Patient states that she feels good  Objective: See treatment diary below  Assessment:  Patient presents without neck pain and without radiating symptoms today  Tolerated treatment well  Patient would benefit from continued PT  Plan: Continue per plan of care        Precautions:  N/A    Manuals        UPAs C5-7 R AZ AZ AZ AZ AZ AZ       Manual traction AZ AZ AZ AZ AZ AZ       Median n glides  AZ           STM/MFR  AZ  AZ AZ AZ       UT release  AZ  AZ AZ AZ       Thoracic UPAs    AZ AZ AZ       Neuro Re-Ed             Scap ret  20x5s 20x5s 20x5s 10x5s 20x5s       Scapt ext     10x5s 20x5s       W's     10x5s 20x5s       Lawnmower     10x5s 20x5s       Ther Ex             Chin tucks  10x 10x 2x10 5s sup home 20x5s                                              Ther Activity                                       Gait Training                                       Modalities               10' 10' 10' 10' 10'       TX  25/10 int 10'  20/10 int 10' 20/10 int 10' 25/15 int 10'

## 2022-05-24 ENCOUNTER — OFFICE VISIT (OUTPATIENT)
Dept: FAMILY MEDICINE CLINIC | Facility: CLINIC | Age: 47
End: 2022-05-24
Payer: COMMERCIAL

## 2022-05-24 ENCOUNTER — OFFICE VISIT (OUTPATIENT)
Dept: PSYCHIATRY | Facility: CLINIC | Age: 47
End: 2022-05-24

## 2022-05-24 VITALS
HEART RATE: 68 BPM | HEIGHT: 66 IN | BODY MASS INDEX: 43.23 KG/M2 | DIASTOLIC BLOOD PRESSURE: 88 MMHG | SYSTOLIC BLOOD PRESSURE: 132 MMHG | OXYGEN SATURATION: 99 % | TEMPERATURE: 97.9 F | WEIGHT: 269 LBS

## 2022-05-24 DIAGNOSIS — M54.2 NECK PAIN ON RIGHT SIDE: ICD-10-CM

## 2022-05-24 DIAGNOSIS — F39 UNSPECIFIED MOOD (AFFECTIVE) DISORDER (HCC): ICD-10-CM

## 2022-05-24 DIAGNOSIS — E66.01 MORBID OBESITY (HCC): ICD-10-CM

## 2022-05-24 DIAGNOSIS — Z12.11 ENCOUNTER FOR SCREENING COLONOSCOPY: ICD-10-CM

## 2022-05-24 DIAGNOSIS — F41.1 GENERALIZED ANXIETY DISORDER: Primary | ICD-10-CM

## 2022-05-24 DIAGNOSIS — R03.0 ELEVATED BLOOD PRESSURE READING IN OFFICE WITHOUT DIAGNOSIS OF HYPERTENSION: Primary | ICD-10-CM

## 2022-05-24 PROCEDURE — 99214 OFFICE O/P EST MOD 30 MIN: CPT | Performed by: FAMILY MEDICINE

## 2022-05-24 RX ORDER — MELOXICAM 7.5 MG/1
7.5 TABLET ORAL DAILY
COMMUNITY
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
## 2022-05-24 NOTE — PROGRESS NOTES
Assessment/Plan:         Diagnoses and all orders for this visit:    Elevated blood pressure reading in office without diagnosis of hypertension  Comments:  weight loss trial - monitor    Morbid obesity ([address] )    Neck pain on right side  Comments:  improved/resolving, cpm    Encounter for screening colonoscopy  -     Ambulatory referral for colonoscopy; Future            Subjective:   Chief Complaint   Patient presents with    Follow-up     Patient is still doing PT and feels about 80-% better   Blood Pressure Check     Elevated at last few visits  Patient ID: Barrie Card is a 48 y o  female      Pt was advised needed appt when she called here few weeks ago for refill of medrol dose pack that had been rx'd 04/29 for neck pain and associated paresthesias in hand; physical therapy had been ordered at acute problem visit 04/27, and pt has been attending sessions with good benefit  bp elev in November and last month at acute neck/shoulder pain visit  No fam hx HTN or close family w/ heart disease (grandfather)  No PIH  Does not eat salty foods  Will be working on weight loss more -"As soon as PT clears me to bike"- "did miles last year"  Neck/shoulder much better  "Still get the pins and needles when typing all day- I'll get up- she gave me exercises to do that help, if I stay sedentary for too long, my whole arm goes numb"  No CP's, SOB, HA's, edema  States she cut way back on caffeine- down to just 2 cans soda a day  Getting  October 2nd      The following portions of the patient's history were reviewed and updated as appropriate: allergies, current medications, past family history, past medical history, past social history, past surgical history and problem list     Review of Systems      Objective:      /88 (BP Location: Left arm, Patient Position: Sitting, Cuff Size: Large)   Pulse 68   Temp 97 9 °F (36 6 °C) (Tympanic)   Ht 5' 6" (1 676 m)   Wt 122 kg (269 lb)   SpO2 99%   BMI 43 42 kg/m²          Physical Exam  Constitutional:       General: She is not in acute distress  Appearance: She is well-developed  She is not ill-appearing, toxic-appearing or diaphoretic  HENT:      Head: Normocephalic and atraumatic  Mouth/Throat:      Pharynx: Uvula midline  Neck:      Trachea: Phonation normal    Cardiovascular:      Rate and Rhythm: Normal rate and regular rhythm  Pulses: Normal pulses  Heart sounds: Normal heart sounds  Pulmonary:      Effort: Pulmonary effort is normal       Breath sounds: Normal breath sounds and air entry  Musculoskeletal:      Right lower leg: No edema  Left lower leg: No edema  Skin:     General: Skin is warm and dry  Coloration: Skin is not pale  Neurological:      General: No focal deficit present  Mental Status: She is alert and oriented to person, place, and time  Gait: Gait normal    Psychiatric:         Mood and Affect: Mood normal          Behavior: Behavior normal  Behavior is cooperative  BMI Counseling: Body mass index is 43 42 kg/m²  The BMI is above normal  Nutrition recommendations include reducing portion sizes and decreasing overall calorie intake  Exercise recommendations include exercising 3-5 times per week

## 2022-05-25 ENCOUNTER — OFFICE VISIT (OUTPATIENT)
Dept: PHYSICAL THERAPY | Facility: MEDICAL CENTER | Age: 47
End: 2022-05-25
Payer: COMMERCIAL

## 2022-05-25 DIAGNOSIS — M54.2 NECK PAIN ON RIGHT SIDE: ICD-10-CM

## 2022-05-25 DIAGNOSIS — M25.511 ACUTE PAIN OF RIGHT SHOULDER: Primary | ICD-10-CM

## 2022-05-25 PROCEDURE — 97140 MANUAL THERAPY 1/> REGIONS: CPT | Performed by: PHYSICAL THERAPIST

## 2022-05-25 PROCEDURE — 97012 MECHANICAL TRACTION THERAPY: CPT | Performed by: PHYSICAL THERAPIST

## 2022-05-25 NOTE — PROGRESS NOTES
Daily Note     Today's date: 2022  Patient name: Екатерина Palacios  : 1975  MRN: [id number]  Referring provider: Daniel Aguilar DO  Dx:   Encounter Diagnosis     ICD-10-CM    1  Acute pain of right shoulder  M25 511    2  Neck pain on right side  M54 2                   Subjective:  Patient states that she is doing well, except for when typing at work and experiencing tingling in the R UE throughout the deltoid region  Objective: See treatment diary below  Assessment:  Patient demonstrates decreased neck pain  No radiating symptoms today  This is aggravated by typing at work and being in the same position for prolonged periods of time  Postural education provided  Tolerated treatment well  Patient would benefit from continued PT  Plan: Continue per plan of care        Precautions:  N/A    Manuals       UPAs C5-7 R AZ AZ AZ AZ AZ AZ AZ      Manual traction AZ AZ AZ AZ AZ AZ AZ      Median n glides  AZ           STM/MFR  AZ  AZ AZ AZ AZ      UT release  AZ  AZ AZ AZ AZ      Thoracic UPAs    AZ AZ AZ AZ      Neuro Re-Ed             Scap ret  20x5s 20x5s 20x5s 10x5s 20x5s 30x5s      Scapt ext     10x5s 20x5s 30x5s      W's     10x5s 20x5s 30x5s      Lawnmower     10x5s 20x5s 30x5s      Ther Ex             Chin tucks  10x 10x 2x10 5s sup home 20x5s 20x5s                                             Ther Activity                                       Gait Training                                       Modalities               10' 10' 10' 10' 10' 10'      TX  25/10 int 10'  20/10 int 10' 20/10 int 10' 25/15 int 10' 25/15 int 10'

## 2022-05-27 ENCOUNTER — APPOINTMENT (OUTPATIENT)
Dept: PHYSICAL THERAPY | Facility: MEDICAL CENTER | Age: 47
End: 2022-05-27
Payer: COMMERCIAL

## 2022-05-31 ENCOUNTER — OFFICE VISIT (OUTPATIENT)
Dept: PHYSICAL THERAPY | Facility: MEDICAL CENTER | Age: 47
End: 2022-05-31
Payer: COMMERCIAL

## 2022-05-31 DIAGNOSIS — M25.511 ACUTE PAIN OF RIGHT SHOULDER: Primary | ICD-10-CM

## 2022-05-31 DIAGNOSIS — M54.2 NECK PAIN ON RIGHT SIDE: ICD-10-CM

## 2022-05-31 PROCEDURE — 97012 MECHANICAL TRACTION THERAPY: CPT | Performed by: PHYSICAL THERAPIST

## 2022-05-31 PROCEDURE — 97140 MANUAL THERAPY 1/> REGIONS: CPT | Performed by: PHYSICAL THERAPIST

## 2022-05-31 NOTE — PROGRESS NOTES
Daily Note     Today's date: 2022  Patient name: Ailyn Adair  : 1975  MRN: [id number]  Referring provider: Jas Guzmán DO  Dx:   Encounter Diagnosis     ICD-10-CM    1  Acute pain of right shoulder  M25 511    2  Neck pain on right side  M54 2                   Subjective:  Patient states that she feels good  Objective: See treatment diary below    Assessment:  Patient presents without tingling/numbness in the R UE  She continues to do well c/ exercise program   Tolerated treatment well  Patient would benefit from continued PT  Plan: Continue per plan of care        Precautions:  N/A    Manuals      UPAs C5-7 R AZ AZ AZ AZ AZ AZ AZ AZ     Manual traction AZ AZ AZ AZ AZ AZ AZ AZ     Median n glides  AZ      AZ     STM/MFR  AZ  AZ AZ AZ AZ AZ     UT release  AZ  AZ AZ AZ AZ AZ     Thoracic UPAs    AZ AZ AZ AZ AZ     Neuro Re-Ed             Scap ret  20x5s 20x5s 20x5s 10x5s 20x5s 30x5s 30x5s     Scapt ext     10x5s 20x5s 30x5s 30x5s     W's     10x5s 20x5s 30x5s 30x5s     Lawnmower     10x5s 20x5s 30x5s 30x5s     Ther Ex             Chin tucks  10x 10x 2x10 5s sup home 20x5s 20x5s 20x5s                                            Ther Activity                                       Gait Training                                       Modalities               10' 10' 10' 10' 10' 10' 10'      TX  25/10 int 10'  20/10 int 10' 20/10 int 10' 25/15 int 10' 25/15 int 10' 25/15 int 10'

## 2022-06-03 ENCOUNTER — OFFICE VISIT (OUTPATIENT)
Dept: PHYSICAL THERAPY | Facility: MEDICAL CENTER | Age: 47
End: 2022-06-03
Payer: COMMERCIAL

## 2022-06-03 DIAGNOSIS — M54.2 NECK PAIN ON RIGHT SIDE: ICD-10-CM

## 2022-06-03 DIAGNOSIS — M25.511 ACUTE PAIN OF RIGHT SHOULDER: Primary | ICD-10-CM

## 2022-06-03 PROCEDURE — 97140 MANUAL THERAPY 1/> REGIONS: CPT | Performed by: PHYSICAL THERAPIST

## 2022-06-03 PROCEDURE — 97012 MECHANICAL TRACTION THERAPY: CPT | Performed by: PHYSICAL THERAPIST

## 2022-06-03 PROCEDURE — 97110 THERAPEUTIC EXERCISES: CPT | Performed by: PHYSICAL THERAPIST

## 2022-06-09 ENCOUNTER — OFFICE VISIT (OUTPATIENT)
Dept: PHYSICAL THERAPY | Facility: MEDICAL CENTER | Age: 47
End: 2022-06-09
Payer: COMMERCIAL

## 2022-06-09 DIAGNOSIS — M54.2 NECK PAIN ON RIGHT SIDE: ICD-10-CM

## 2022-06-09 DIAGNOSIS — M25.511 ACUTE PAIN OF RIGHT SHOULDER: Primary | ICD-10-CM

## 2022-06-09 PROCEDURE — 97140 MANUAL THERAPY 1/> REGIONS: CPT | Performed by: PHYSICAL THERAPIST

## 2022-06-09 PROCEDURE — 97012 MECHANICAL TRACTION THERAPY: CPT | Performed by: PHYSICAL THERAPIST

## 2022-06-09 NOTE — PROGRESS NOTES
Daily Note     Today's date: 2022  Patient name: Nidia Teran  : 1975  MRN: [id number]  Referring provider: Delphine Wilkins DO  Dx:   Encounter Diagnosis     ICD-10-CM    1  Acute pain of right shoulder  M25 511    2  Neck pain on right side  M54 2                   Subjective:  Patient states that she feels great  Objective: See treatment diary below  Assessment:  Patient presents without pain  She is doing well c/ exercise progressions  Tolerated treatment well  Patient would benefit from continued PT  Plan: Continue per plan of care        Precautions:  N/A    Manuals /6 5/9 5/12 5/16 5/19 5/23 5/25 5/31 6/3 6/9   UPAs C5-7 R AZ AZ AZ AZ AZ AZ AZ AZ AZ AZ   Manual traction AZ AZ AZ AZ AZ AZ AZ AZ AZ AZ   Median n glides  AZ      AZ AZ AZ   STM/MFR  AZ  AZ AZ AZ AZ AZ AZ AZ   UT release  AZ  AZ AZ AZ AZ AZ AZ AZ   Thoracic UPAs    AZ AZ AZ AZ AZ AZ AZ   Neuro Re-Ed             Scap ret  20x5s 20x5s 20x5s 10x5s 20x5s 30x5s 30x5s 20x5 yellow home   Scapt ext     10x5s 20x5s 30x5s 30x5s [id number] home   W's     10x5s 20x5s 30x5s 30x5s [id number] home   Lawnmower     10x5s 20x5s 30x5s 30x5s 20x5s yellow home   Ther Ex             UBE         2' / 2' B    Chin tucks  10x 10x 2x10 5s sup home 20x5s 20x5s 20x5s     Thoracic ext         20x foam home   Tall kneeling lat str c/ thoracic ext         10x 10s home                Ther Activity                                       Gait Training                                       Modalities             MH  10' 10' 10' 10' 10' 10' 10'  10' 10'    TX  25/10 int 10'  20/10 int 10' 20/10 int 10' 25/15 int 10' 25/15 int 10' 25/15 int 10' 25/15 int 10' 25/15 int 10'

## 2022-06-16 ENCOUNTER — OFFICE VISIT (OUTPATIENT)
Dept: PHYSICAL THERAPY | Facility: MEDICAL CENTER | Age: 47
End: 2022-06-16
Payer: COMMERCIAL

## 2022-06-16 DIAGNOSIS — M25.511 ACUTE PAIN OF RIGHT SHOULDER: Primary | ICD-10-CM

## 2022-06-16 DIAGNOSIS — M54.2 NECK PAIN ON RIGHT SIDE: ICD-10-CM

## 2022-06-16 PROCEDURE — 97012 MECHANICAL TRACTION THERAPY: CPT | Performed by: PHYSICAL THERAPIST

## 2022-06-16 PROCEDURE — 97140 MANUAL THERAPY 1/> REGIONS: CPT | Performed by: PHYSICAL THERAPIST

## 2022-06-16 PROCEDURE — 97110 THERAPEUTIC EXERCISES: CPT | Performed by: PHYSICAL THERAPIST

## 2022-06-16 NOTE — PROGRESS NOTES
Daily Note     Today's date: 2022  Patient name: Barrie Card  : 1975  MRN: [id number]  Referring provider: Karuna Palacio DO  Dx:   Encounter Diagnosis     ICD-10-CM    1  Acute pain of right shoulder  M25 511    2  Neck pain on right side  M54 2                   Subjective:  Patient states that she feels great  Objective: See treatment diary below  Assessment: Barrie Card has been compliant with attending PT and home exercise program since initial eval   Beauty Garcia  has made improvements in objective data since initial evalulation and has achieved all goals  Patient reports having returned to their prior level or function  Patient provided with updated Home Exercise Program, all questions answered, verbalized understanding and agreement to plan of care  Thus it was mutually decided to discontinue this episode of care and transition to Home Exercise Program     Plan: d/c to HEP       Precautions:  N/A    Manuals 6/16 5/9 5/12 5/16 5/19 5/23 5/25 5/31 6/3 6/9   UPAs C5-7 R AZ AZ AZ AZ AZ AZ AZ AZ AZ AZ   Manual traction AZ AZ AZ AZ AZ AZ AZ AZ AZ AZ   Median n glides  AZ      AZ AZ AZ   STM/MFR  AZ  AZ AZ AZ AZ AZ AZ AZ   UT release  AZ  AZ AZ AZ AZ AZ AZ AZ   Thoracic UPAs AZ   AZ AZ AZ AZ AZ AZ AZ   Neuro Re-Ed             Scap ret  20x5s 20x5s 20x5s 10x5s 20x5s 30x5s 30x5s 20x5 yellow home   Scapt ext     10x5s 20x5s 30x5s 30x5s [id number] home   W's     10x5s 20x5s 30x5s 30x5s [id number] home   Lawnmower     10x5s 20x5s 30x5s 30x5s 20x5s yellow home   Ther Ex HEP Review            UBE         2' F/ 2' B    Chin tucks  10x 10x 2x10 5s sup home 20x5s 20x5s 20x5s     Thoracic ext         20x foam home   Tall kneeling lat str c/ thoracic ext         10x 10s home                Ther Activity                                       Gait Training                                       Modalities             MH 10' 10' 10' 10' 10' 10' 10' 10'  10' 10'    TX 25/15 int 10' 25/10 int 10'  20/10 int 10' 20/10 int 10' 25/15 int 10' 25/15 int 10' 25/15 int 10' 25/15 int 10' 25/15 int 10'

## 2022-06-28 ENCOUNTER — OFFICE VISIT (OUTPATIENT)
Dept: PSYCHIATRY | Facility: CLINIC | Age: 47
End: 2022-06-28

## 2022-06-28 DIAGNOSIS — F41.9 ANXIETY AND DEPRESSION: ICD-10-CM

## 2022-06-28 DIAGNOSIS — F32.A ANXIETY AND DEPRESSION: ICD-10-CM

## 2022-06-28 DIAGNOSIS — F41.1 GAD (GENERALIZED ANXIETY DISORDER): Primary | ICD-10-CM

## 2022-06-28 RX ORDER — SERTRALINE HYDROCHLORIDE 100 MG/1
100 TABLET, FILM COATED ORAL DAILY
Qty: 90 TABLET | Refills: 0 | Status: SHIPPED | OUTPATIENT
Start: 2022-06-28

## 2022-06-28 RX ORDER — BUPROPION HYDROCHLORIDE 300 MG/1
300 TABLET ORAL EVERY MORNING
Qty: 90 TABLET | Refills: 0 | Status: SHIPPED | OUTPATIENT
Start: 2022-06-28

## 2022-06-28 NOTE — PATIENT INSTRUCTIONS
Please call the office nursing staff for medication issues including refills, problems getting medications, bothersome side effects, etc at [phone]  Please return for a follow up appointment as discussed  If you are running late or are unable to attend your appointment, please call our [place] office at [phone], or if you were seen in the [place] office, please call [phone]  If you have thoughts of harming yourself or are otherwise in psychological crisis, do not hesitate to contact your [facility] hotline, or go to the nearest emergency room    [facility] Crisis: [address] Crisis: [phone]  [address] Crisis: [address] Crisis: [address] Crisis: [address] Crisis: [address]  Crisis: [phone]  [place] Suicide Prevention Hotline: 0-883.416.5839
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
## 2022-08-02 ENCOUNTER — APPOINTMENT (OUTPATIENT)
Dept: LAB | Facility: MEDICAL CENTER | Age: 47
End: 2022-08-02

## 2022-08-02 DIAGNOSIS — Z00.8 HEALTH EXAMINATION IN POPULATION SURVEY: ICD-10-CM

## 2022-08-02 LAB
CHOLEST SERPL-MCNC: 159 MG/DL
EST. AVERAGE GLUCOSE BLD GHB EST-MCNC: 103 MG/DL
HBA1C MFR BLD: 5.2 %
HDLC SERPL-MCNC: 44 MG/DL
LDLC SERPL CALC-MCNC: 81 MG/DL (ref 0–100)
NONHDLC SERPL-MCNC: 115 MG/DL
TRIGL SERPL-MCNC: 168 MG/DL

## 2022-08-02 PROCEDURE — 36415 COLL VENOUS BLD VENIPUNCTURE: CPT

## 2022-08-02 PROCEDURE — 80061 LIPID PANEL: CPT

## 2022-08-02 PROCEDURE — 83036 HEMOGLOBIN GLYCOSYLATED A1C: CPT

## 2022-08-17 ENCOUNTER — TELEPHONE (OUTPATIENT)
Dept: PSYCHIATRY | Facility: CLINIC | Age: 47
End: 2022-08-17

## 2022-08-17 NOTE — TELEPHONE ENCOUNTER
was calling in regards to therapy wait list and therapy anywhere  LVM for pt to contact intake dept

## 2022-08-19 ENCOUNTER — TELEPHONE (OUTPATIENT)
Dept: PSYCHIATRY | Facility: CLINIC | Age: 47
End: 2022-08-19

## 2022-08-29 NOTE — TELEPHONE ENCOUNTER
LM to R/S NP cancellation on 8/18     3rd time calling     Unsure how you want to proceed (Interest letter by mail or message from provider through [address])

## 2022-09-15 DIAGNOSIS — F41.1 GAD (GENERALIZED ANXIETY DISORDER): ICD-10-CM

## 2022-09-15 DIAGNOSIS — F41.9 ANXIETY AND DEPRESSION: ICD-10-CM

## 2022-09-15 DIAGNOSIS — F32.A ANXIETY AND DEPRESSION: ICD-10-CM

## 2022-09-15 NOTE — TELEPHONE ENCOUNTER
Pt called and LVM in reference meds being called in to the wrong pharmacy I tried to call back and lvm

## 2022-09-20 NOTE — TELEPHONE ENCOUNTER
Pt called in and lvm stating that she spoke to someone last week about her mediation being called into the wrong pharmacy  Please reach out to her

## 2022-09-21 RX ORDER — BUPROPION HYDROCHLORIDE 300 MG/1
300 TABLET ORAL EVERY MORNING
Qty: 90 TABLET | Refills: 0 | Status: SHIPPED | OUTPATIENT
Start: 2022-09-21 | End: 2022-10-20 | Stop reason: SDUPTHER

## 2022-09-21 RX ORDER — SERTRALINE HYDROCHLORIDE 100 MG/1
100 TABLET, FILM COATED ORAL DAILY
Qty: 90 TABLET | Refills: 0 | Status: SHIPPED | OUTPATIENT
Start: 2022-09-21 | End: 2022-10-20 | Stop reason: SDUPTHER

## 2022-09-21 NOTE — TELEPHONE ENCOUNTER
Called patient and LVM to call resident line back  I stated on the message that we did sent any prescription over so we are not sure if we need to change pharmacys

## 2022-09-21 NOTE — TELEPHONE ENCOUNTER
Patient called back and stated that she needs the two refills to be sent to [facility] for 90 days and not [facility]

## 2022-09-30 ENCOUNTER — TELEMEDICINE (OUTPATIENT)
Dept: PSYCHIATRY | Facility: CLINIC | Age: 47
End: 2022-09-30
Payer: COMMERCIAL

## 2022-09-30 DIAGNOSIS — F32.A ANXIETY AND DEPRESSION: Primary | ICD-10-CM

## 2022-09-30 DIAGNOSIS — F41.9 ANXIETY AND DEPRESSION: Primary | ICD-10-CM

## 2022-09-30 PROCEDURE — 90834 PSYTX W PT 45 MINUTES: CPT
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
## 2022-10-20 ENCOUNTER — OFFICE VISIT (OUTPATIENT)
Dept: PSYCHIATRY | Facility: CLINIC | Age: 47
End: 2022-10-20

## 2022-10-20 DIAGNOSIS — F43.10 POST TRAUMATIC STRESS DISORDER (PTSD): ICD-10-CM

## 2022-10-20 DIAGNOSIS — F41.1 GAD (GENERALIZED ANXIETY DISORDER): ICD-10-CM

## 2022-10-20 DIAGNOSIS — F41.1 GENERALIZED ANXIETY DISORDER: Primary | ICD-10-CM

## 2022-10-20 DIAGNOSIS — F41.9 ANXIETY AND DEPRESSION: ICD-10-CM

## 2022-10-20 DIAGNOSIS — F32.A ANXIETY AND DEPRESSION: ICD-10-CM

## 2022-10-20 PROCEDURE — NC001 PR NO CHARGE: Performed by: STUDENT IN AN ORGANIZED HEALTH CARE EDUCATION/TRAINING PROGRAM

## 2022-10-20 RX ORDER — SERTRALINE HYDROCHLORIDE 100 MG/1
100 TABLET, FILM COATED ORAL DAILY
Qty: 90 TABLET | Refills: 1 | Status: SHIPPED | OUTPATIENT
Start: 2022-10-20

## 2022-10-20 RX ORDER — BUPROPION HYDROCHLORIDE 300 MG/1
300 TABLET ORAL EVERY MORNING
Qty: 90 TABLET | Refills: 1 | Status: SHIPPED | OUTPATIENT
Start: 2022-10-20
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
## 2022-10-20 NOTE — PATIENT INSTRUCTIONS
Please call the office nursing staff for medication issues including refills, problems getting medications, bothersome side effects, etc at [phone]  Please return for a follow up appointment as discussed  If you are running late or are unable to attend your appointment, please call our [place] office at [phone], or if you were seen through the OBGYN office, please call [phone] and ask for Theresa Arnett  If you have thoughts of harming yourself or are otherwise in psychological crisis, do not hesitate to contact your [facility] hotline, or go to the nearest emergency room    [facility] Crisis: [facility] Crisis: [phone]  [facility] Crisis: [address] Crisis: [address] Crisis: [address] Crisis: [address] [facility] Crisis: [phone]  National Suicide Prevention Hotline: 2-730.858.7291

## 2022-10-20 NOTE — BH TREATMENT PLAN
TREATMENT PLAN        [facility]    Name and Date of Birth: Any Newberry 48 y o  1975  Date of Treatment Plan: October 20, 2022  Diagnosis/Diagnoses:    1  Generalized anxiety disorder    2  Post traumatic stress disorder (PTSD)    3  Anxiety and depression    4  CIPRIANO (generalized anxiety disorder)        Strengths/Personal Resources for Self-Care: therapy, family, medication    Area/Areas of need: "childhood trauma, fight or flight"    Long Term Goal: Maintain good mood and control over anxiety  Target Date: 6 months - April 20, 2023  Person/Persons responsible for completion of goal: Pam Xavier     Short Term Objective (s) - How will we reach this goal?:   Take medications as prescribed  Attend psychiatry appointments regularly  Continue psychotherapy regularly  Target Date: 3 months - January 20, 2023  Person/Persons Responsible for Completion of Goal: Jammie     Progress Towards Goals: Continuing treatment    Treatment Modality: medication management every Six months or sooner if needed  Review due 180 days from date of this plan: April 18, 2023   Expected length of service: Ongoing treatment    My physician and I have developed this plan together, and I agree to work on the goals and objectives  I understand the treatment goals that were developed for my treatment  The treatment plan was created between Luz Marina Velasquez MD and Any Newberry on 10/20/22 at 3:28 PM but not signed at the time of the visit due to [address] [place] [address]  The plan was reviewed, and verbal consent was given

## 2022-11-11 ENCOUNTER — TELEMEDICINE (OUTPATIENT)
Dept: PSYCHIATRY | Facility: CLINIC | Age: 47
End: 2022-11-11

## 2022-11-11 DIAGNOSIS — F41.9 ANXIETY AND DEPRESSION: Primary | ICD-10-CM

## 2022-11-11 DIAGNOSIS — F32.A ANXIETY AND DEPRESSION: Primary | ICD-10-CM

## 2022-11-11 NOTE — PSYCH
Virtual Regular Visit    Verification of patient location:    Patient is located in the following state in which I hold an active license [place]      Assessment/Plan:    Problem List Items Addressed This Visit    None         Goals addressed in session: Goal 2      Chanelle Jamison and ananya discussed CBT skills learned and employed to combat anxiety and worry  Discussed and processed triggers, previous traumas that impact her behavior  Gery Lennox is going to continue to use CBT skills learned coupled with psychoeducation provided to work toward decreasing anxiety, worry, and OCD tendencies  Reason for visit is   Chief Complaint   Patient presents with   • Virtual Regular Visit        Encounter provider Cirilo Montgomery LCSW    Provider located at [address] [place] [id number] [phone]      Recent Visits  No visits were found meeting these conditions  Showing recent visits within past 7 days and meeting all other requirements  Today's Visits  Date Type Provider Dept   11/11/22 [facility]   Showing today's visits and meeting all other requirements  Future Appointments  No visits were found meeting these conditions  Showing future appointments within next 150 days and meeting all other requirements       The patient was identified by name and date of birth  Ana Tan was informed that this is a telemedicine visit and [facility]  She agrees to proceed     My office door was closed  No one else was in the room  She acknowledged consent and understanding of privacy and security of the video platform  The patient has agreed to participate and understands they can discontinue the visit at any time  Patient is aware this is a billable service       Subjective  Ana Tan is a 50 y o  female who recently go  and verbalized having a wonderful wedding  Orville Elias talked a lot about her anxiety and was inquisitive as to why she is so anxiety and frustrated over minor issues in her day to day life such as cleanliness and order? Orville Elias and lindsay processed a lot about her trauma history, hyper-vigilance, how such evolves to anxiety and even OCD tendencies due to wanting order, control, and a calm environment  Madelaineaure Elias agreed and was receptive  Clinician encouraged Orville Elias to continue utilizing her CBT skills to combat her anxiety and OCD tendencies and the frustration that comes with that  Orville Elias also reported she is using her CBT concepts to curb her worry when her  and daughter are coming home from work and she worries for their safety  She added that CBT has helped and she is getting better with that  HPI Orville Elias was in good spirits, calm and cooperative  She is good insight and judgement, her thinking was linear and goal directed and she is motivated to make progress being an active participant in her session  Past Medical History:   Diagnosis Date   • Abnormal Pap smear of cervix        Past Surgical History:   Procedure Laterality Date   • CHOLECYSTECTOMY     • INSERTION OF INTRAUTERINE DEVICE (IUD)  02/2022   • KIDNEY STONE SURGERY     • MOUTH BIOPSY     • TUBAL LIGATION         Current Outpatient Medications   Medication Sig Dispense Refill   • buPROPion (WELLBUTRIN XL) 300 mg 24 hr tablet Take 1 tablet (300 mg total) by mouth every morning 90 tablet 1   • cholecalciferol (VITAMIN D3) 1,000 units tablet Take 1 tablet (1,000 Units total) by mouth daily 90 tablet 1   • meloxicam (MOBIC) 7 5 mg tablet Take 7 5 mg by mouth in the morning  • sertraline (ZOLOFT) 100 mg tablet Take 1 tablet (100 mg total) by mouth daily 90 tablet 1     No current facility-administered medications for this visit          Allergies   Allergen Reactions   • Amoxicillin Hives   • Azithromycin Vomiting and Hives   • Penicillins Hives       Review of Systems    Video Exam    There were no vitals filed for this visit  Physical Exam     Visit Time    Visit Start Time: 8486  Visit Stop Time: 1400  Total Visit Duration: 60 minutes    INDIVIDUAL SESSION NOTE     Length of time in session: 60 minutes, follow up in TBD weeks     Psychotherapy Provided: Individual      Diagnosis ICD-10-CM Associated Orders   1  Anxiety and depression  F41 9     F32  A           Goals addressed in session: Goal 2     Pain:      none    0    Current suicide risk:  minimal    Data: Met with Faiza Taylor for a scheduled individual session  Topics discussed included coping skills  Adriana Pierce is feeling "good"  Adriana Pierce shows evidence of utilizing Mindfulness-based strategies and CBT thought record to manage mental health symptoms  During this session, this clinical used the following therapeutic modalities CBT techniques and motivational interviewing  Assessment:  Adriana Pierce presents with a normal mood  her affect is normal range and intensity, appropriate  Jammie was oriented x3  She was focused and engaged  Adriana Pierce exhibits good therapeutic rapport with this clinician  she continues to exhibit willingness to work on treatment goals and objectives  Adriana Pierce presents with a minimal risk of suicide, minimal risk of self-harm and minimal of harm to others  Plan: Adriana Pierce will return in TBD due to work schedule weeks for the next scheduled session  Between sessions, she  will continue to employ CBT skills learned to combat OCD tendencies and anxiety and will report back during the next session re: success and barriers  At the next session, this clinical will use CBT techniques and motivational interviewing to address any successes or barrier to Adriana Pierce reframing her thinking to curb anxiety, in an effort to assist Adriana Pierce with meeting treatment goals        Behavioral Health Treatment Plan [facility]: Diagnosis and Treatment Plan explained to Tapan Rodriguez relates understanding diagnosis and is agreeable to Treatment Plan   Yes

## 2022-12-12 ENCOUNTER — PATIENT MESSAGE (OUTPATIENT)
Dept: OBGYN CLINIC | Facility: MEDICAL CENTER | Age: 47
End: 2022-12-12

## 2022-12-12 DIAGNOSIS — N76.0 BACTERIAL VAGINOSIS: Primary | ICD-10-CM

## 2022-12-12 DIAGNOSIS — B96.89 BACTERIAL VAGINOSIS: Primary | ICD-10-CM

## 2022-12-16 RX ORDER — METRONIDAZOLE 500 MG/1
500 TABLET ORAL EVERY 12 HOURS SCHEDULED
Qty: 14 TABLET | Refills: 0 | Status: SHIPPED | OUTPATIENT
Start: 2022-12-16 | End: 2022-12-23